# Patient Record
Sex: FEMALE | Race: WHITE
[De-identification: names, ages, dates, MRNs, and addresses within clinical notes are randomized per-mention and may not be internally consistent; named-entity substitution may affect disease eponyms.]

---

## 2017-02-23 ENCOUNTER — HOSPITAL ENCOUNTER (OUTPATIENT)
Dept: HOSPITAL 84 - D.RAD | Age: 72
Setting detail: OBSERVATION
LOS: 3 days | Discharge: NURSING, REHAB OR LONG-TERM CARE | End: 2017-02-26
Attending: FAMILY MEDICINE | Admitting: FAMILY MEDICINE
Payer: MEDICARE

## 2017-02-23 VITALS — SYSTOLIC BLOOD PRESSURE: 182 MMHG | DIASTOLIC BLOOD PRESSURE: 78 MMHG

## 2017-02-23 VITALS — BODY MASS INDEX: 33.8 KG/M2 | HEIGHT: 64 IN | BODY MASS INDEX: 33.8 KG/M2 | WEIGHT: 198 LBS

## 2017-02-23 DIAGNOSIS — E78.5: ICD-10-CM

## 2017-02-23 DIAGNOSIS — Z79.4: ICD-10-CM

## 2017-02-23 DIAGNOSIS — L89.153: ICD-10-CM

## 2017-02-23 DIAGNOSIS — E11.65: ICD-10-CM

## 2017-02-23 DIAGNOSIS — D50.9: ICD-10-CM

## 2017-02-23 DIAGNOSIS — R53.2: ICD-10-CM

## 2017-02-23 DIAGNOSIS — I69.391: Primary | ICD-10-CM

## 2017-02-23 DIAGNOSIS — R13.10: ICD-10-CM

## 2017-02-23 DIAGNOSIS — I69.351: ICD-10-CM

## 2017-02-23 DIAGNOSIS — I10: ICD-10-CM

## 2017-02-23 NOTE — NUR
PATIENT RECEIVED TO ROOM 2205 VIA  DIRECT ADMIT TO DR. RICHARDSON. A/O X3. FAMILY
AT BEDSIDE. STAGE 3 TO COCCYX AREA WHICH APPEARS TO BE HEALING AT THIS TIME.
POSITIONED ON LEFT SIDE FOR COMFORT. DENIES NEEDS.

## 2017-02-23 NOTE — NUR
PATIENT SLEEPING ON LEFT SIDE. HOB 30 DEGREES. RR EVEN AND UNLABORED. 0 S/S OF
DISTRESS. IV TO RIGHT WRIST PATENT WITH NO REDNESS OR SWELLING. CHRONIC KELLEY
DRAINING TO GRAVITY. COLOSTOMY TO LLQ WITH SMALL AMOUNT OF FORMED STOOL.
MEPILEX TO COCCYX CDI AND LIDOCAINE PATCH TO LOWER BACK. DAUGHTER AT BEDSIDE.
SRX2. BED LOW. CALL LIGHT WITHIN REACH.

## 2017-02-24 VITALS — DIASTOLIC BLOOD PRESSURE: 56 MMHG | SYSTOLIC BLOOD PRESSURE: 128 MMHG

## 2017-02-24 VITALS — SYSTOLIC BLOOD PRESSURE: 178 MMHG | DIASTOLIC BLOOD PRESSURE: 94 MMHG

## 2017-02-24 VITALS — DIASTOLIC BLOOD PRESSURE: 93 MMHG | SYSTOLIC BLOOD PRESSURE: 198 MMHG

## 2017-02-24 VITALS — DIASTOLIC BLOOD PRESSURE: 83 MMHG | SYSTOLIC BLOOD PRESSURE: 156 MMHG

## 2017-02-24 VITALS — DIASTOLIC BLOOD PRESSURE: 77 MMHG | SYSTOLIC BLOOD PRESSURE: 150 MMHG

## 2017-02-24 VITALS — SYSTOLIC BLOOD PRESSURE: 193 MMHG | DIASTOLIC BLOOD PRESSURE: 78 MMHG

## 2017-02-24 VITALS — DIASTOLIC BLOOD PRESSURE: 75 MMHG | SYSTOLIC BLOOD PRESSURE: 183 MMHG

## 2017-02-24 VITALS — SYSTOLIC BLOOD PRESSURE: 198 MMHG | DIASTOLIC BLOOD PRESSURE: 78 MMHG

## 2017-02-24 VITALS — SYSTOLIC BLOOD PRESSURE: 157 MMHG | DIASTOLIC BLOOD PRESSURE: 84 MMHG

## 2017-02-24 VITALS — SYSTOLIC BLOOD PRESSURE: 219 MMHG | DIASTOLIC BLOOD PRESSURE: 100 MMHG

## 2017-02-24 VITALS — SYSTOLIC BLOOD PRESSURE: 139 MMHG | DIASTOLIC BLOOD PRESSURE: 68 MMHG

## 2017-02-24 VITALS — DIASTOLIC BLOOD PRESSURE: 87 MMHG | SYSTOLIC BLOOD PRESSURE: 177 MMHG

## 2017-02-24 LAB
ALBUMIN SERPL-MCNC: 3 G/DL (ref 3.4–5)
ALP SERPL-CCNC: 78 U/L (ref 46–116)
ALT SERPL-CCNC: 20 U/L (ref 10–68)
ANION GAP SERPL CALC-SCNC: 11.8 MMOL/L (ref 8–16)
BASOPHILS NFR BLD AUTO: 0.9 % (ref 0–2)
BILIRUB SERPL-MCNC: 0.31 MG/DL (ref 0.2–1.3)
BUN SERPL-MCNC: 22 MG/DL (ref 7–18)
CALCIUM SERPL-MCNC: 9.6 MG/DL (ref 8.5–10.1)
CHLORIDE SERPL-SCNC: 105 MMOL/L (ref 98–107)
CO2 SERPL-SCNC: 28.8 MMOL/L (ref 21–32)
CREAT SERPL-MCNC: 1.6 MG/DL (ref 0.6–1.3)
EOSINOPHIL NFR BLD: 2.4 % (ref 0–7)
ERYTHROCYTE [DISTWIDTH] IN BLOOD BY AUTOMATED COUNT: 12.7 % (ref 11.5–14.5)
GLOBULIN SER-MCNC: 5.6 G/L
GLUCOSE SERPL-MCNC: 197 MG/DL (ref 74–106)
HCT VFR BLD CALC: 38.5 % (ref 36–48)
HGB BLD-MCNC: 12.4 G/DL (ref 12–16)
IMM GRANULOCYTES NFR BLD: 0.2 % (ref 0–5)
LYMPHOCYTES NFR BLD AUTO: 37.6 % (ref 15–50)
MCH RBC QN AUTO: 28.7 PG (ref 26–34)
MCHC RBC AUTO-ENTMCNC: 32.2 G/DL (ref 31–37)
MCV RBC: 89.1 FL (ref 80–100)
MONOCYTES NFR BLD: 8.6 % (ref 2–11)
NEUTROPHILS NFR BLD AUTO: 50.3 % (ref 40–80)
OSMOLALITY SERPL CALC.SUM OF ELEC: 290 MOSM/KG (ref 275–300)
PLATELET # BLD: 334 10X3/UL (ref 130–400)
PMV BLD AUTO: 10.6 FL (ref 7.4–10.4)
POTASSIUM SERPL-SCNC: 3.6 MMOL/L (ref 3.5–5.1)
PROT SERPL-MCNC: 8.6 G/DL (ref 6.4–8.2)
RBC # BLD AUTO: 4.32 10X6/UL (ref 4–5.4)
SODIUM SERPL-SCNC: 142 MMOL/L (ref 136–145)
WBC # BLD AUTO: 9.1 10X3/UL (ref 4.8–10.8)

## 2017-02-24 NOTE — NUR
PATIENT ON LEFT SIDE. HOB 30 DEGREES. AAOX4. RR EVEN AND UNLABORED. PAIN A
7/10 ACCORDING TO THE FLACC SCALE. DEMEROL GIVEN PER ORDER. WILL REASSESS. IV
TO RIGHT WRIST PATENT WITH NO REDNESS OR SWELLING. DRESSING AROUND PEG TUBE
SATURATED. ACCORDING TO DIANA SALGADO RN, DR. PENG IS AWARE AND INSTRUCTED
TO "APPLY PRESSURE AND IT WILL CLOT OFF." DRESSING CHANGED. COLOSTOMY WITH
FORMED STOOL. CHRONIC CATH DRAINING TO GRAVITY. DRESSING TO COCCYX CDI. SON AT
BEDSIDE. CALL LIGHT WITHIN REACH.

## 2017-02-24 NOTE — NUR
Nutrition Consult:
Orders in to start TF of Glucerna 1.0 on 2/25/17. Start TF of Glucerna 1.0 @
20 ml/hr. Advance 10 ml every 6-8 hours as tolerated to goal rate of 75 ml/hr.
Water flushes 25 ml/hr.
RD will continue to monitor pt progress.

## 2017-02-24 NOTE — NUR
WOUND CARE CONSULT:
NOTED PT TO HAVE A CHRONIC PRESSURE INJURY THAT APPEARS AS A HEALING STAGE 4.
IT MEASURES 5CM X 7CM X 1CM.  THE WOUND BED IS PINK WITH YELLOW.  THERE IS NO
ODOR.  PT IS A RESIDENT AT [facility] AND THE CURRENT TX IS SANTYL.  RECOMMEND
CONTINUING TO USE THIS.
WOUND CARE WILL MONITOR.

## 2017-02-24 NOTE — NUR
PEG SITE CONTINUES TO OOZE. SPOKE WITH JASON AT DR. WAGGONER OFFICE. HE WAS
NOTIFIED PER JASON. TOLD TO HOLD PRESSURE TO AREA. OOZING HAS SLOWED DOWN BUT
STILL OCCURRING. WILL CONTINUE TO MONITOR.

## 2017-02-24 NOTE — NUR
NEW ORDERS RECEIVED FOR PAIN MANAGEMENT. GIVEN 1MG MORPHINE SLOW IVP FOR PAIN
LEVEL 7. WILL MONITOR.

## 2017-02-24 NOTE — NUR
PT. AAO X4. PT. NONVERBAL. PT. SITTING UP IN BED WITH DAUGHTER PRESENT. PT.
DENIES PAIN AT THIS TIME. RESP. EVEN AND NONLABORED LUNG SOUNDS CLEAR.
CONTRACTURES OF BILATERAL FEET. WOUND ON COCCYX STAGE III PRESSURE ULCER
COVERED IN A DRESSING. SKIN PINK WARM AND DRY WITH GOOD TURGOR AND NO EDEMA
HANDS AND TOES COOL TO TOUCH. VISUAL NOD AFFIRMING
FEELING  ME TOUCHING HANDS AND FEET. ABDOMEN SOFT AND NONTENDER. [id number]
BED AT LOWEST SETTING. SRX2 CALL LIGHT WITHIN REACH. DAUGHTER IN ROOM

## 2017-02-24 NOTE — CN
PATIENT NAME:JEISON VALENZUELA                                MEDICAL RECORD: [id number]
: 43                                              LOCATION:D.[facility]
ADMIT DATE: 17                                       ACCOUNT: [id number]
CONSULTING PHYSICIAN:    TACHO PENG MD              
                                               
REFERRING PHYSICIAN:     HANNA RICHARDSON M.D.              
 
 
DATE OF CONSULTATION:  2017
 
Gastrointestinal Consultation
 
REFERRING PHYSICIAN:  Hanna Richardson MD.
 
HISTORY OF PRESENT ILLNESS:  The patient is a 71-year-old white female who
resides in a local nursing home and who has a history of cerebrovascular
disease, stemming from right carotid endarterectomy back in  with subsequent
right hemiplegia, aphasia, and temporary dysphagia, is now admitted with
recurrent problems, dysphagia, now documented aspiration on bedside swallow
eval.  I was asked to see the patient for possible PEG tube placement.
 
She apparently had a PEG tube placed for a few months after initial stroke in
.  She responded to speech therapy exercises and was able to tolerate a
normal diet until just a few days ago.
 
PAST MEDICAL HISTORY:  As above.  She also has diabetes, chronic Trejo,
hypertension, hyperlipidemia.
 
SOCIAL HISTORY:  The patient is a nonsmoker, nondrinker.
 
FAMILY HISTORY:  Negative for GI disease.
 
REVIEW OF SYSTEMS:  Noncontributory other than in the HPI..
 
ALLERGIES:  ROCEPHIN AND LEVAQUIN.
 
HOME MEDICATIONS:  Include aspirin, Lidocaine patch, buspirone, lisinopril,
Plavix, iron sulfate, Lopressor, Xalatan eye drops, Pravachol, DuoNeb, Norco,
clonidine p.r.n., tramadol and insulin.
 
PHYSICAL EXAMINATION:
GENERAL:  Reveals an elderly white female with aphasia, but in no acute
distress.
VITAL SIGNS:  Stable.  She is afebrile.
CHEST:  Clear.
HEART:  Regular rate and rhythm.
ABDOMEN:  Soft, nontender.
EXTREMITIES:  No edema.  I should note that she has a colostomy in place and she
has normal formed brown stool in it.
 
LABORATORY DATA:  Pending at time of this dictation.
 
IMPRESSION:  Recurrent oropharyngeal dysphagia, most likely due to recurrent
cerebrovascular accident, now with documented aspiration on bedside swallow
eval.
 
RECOMMENDATION:  PEG tube placement in a.m.
 
 
 
 
CONSULT REPORT                                 [id number]    JEISON VALENZUELA              
 
 
TRANSINT:[id number] Voice Confirmation ID: [id number] DOCUMENT ID: [id number]
                                           
                                           TACHO PENG MD              
 
 
 
Electronically Signed by TACHO PENG on 17 at 1215
 
 
 
 
 
 
 
 
 
 
 
 
 
 
 
 
 
 
 
 
 
 
 
 
 
 
 
 
 
 
 
 
 
 
 
 
 
 
 
 
CC: HANNA RICHARDSON M.D.                                            [id number]
DICTATION DATE: 17     :     17 0029      ADM IN  
                                                                              
[place] [address] [place] [id number]

## 2017-02-25 VITALS — DIASTOLIC BLOOD PRESSURE: 68 MMHG | SYSTOLIC BLOOD PRESSURE: 156 MMHG

## 2017-02-25 VITALS — DIASTOLIC BLOOD PRESSURE: 82 MMHG | SYSTOLIC BLOOD PRESSURE: 193 MMHG

## 2017-02-25 VITALS — DIASTOLIC BLOOD PRESSURE: 77 MMHG | SYSTOLIC BLOOD PRESSURE: 176 MMHG

## 2017-02-25 VITALS — SYSTOLIC BLOOD PRESSURE: 117 MMHG | DIASTOLIC BLOOD PRESSURE: 63 MMHG

## 2017-02-25 VITALS — SYSTOLIC BLOOD PRESSURE: 182 MMHG | DIASTOLIC BLOOD PRESSURE: 85 MMHG

## 2017-02-25 LAB
ALBUMIN SERPL-MCNC: 2.6 G/DL (ref 3.4–5)
ALP SERPL-CCNC: 72 U/L (ref 46–116)
ALT SERPL-CCNC: 21 U/L (ref 10–68)
ANION GAP SERPL CALC-SCNC: 4.8 MMOL/L (ref 8–16)
BASOPHILS NFR BLD AUTO: 0.4 % (ref 0–2)
BILIRUB SERPL-MCNC: 0.3 MG/DL (ref 0.2–1.3)
BUN SERPL-MCNC: 20 MG/DL (ref 7–18)
CALCIUM SERPL-MCNC: 8.9 MG/DL (ref 8.5–10.1)
CHLORIDE SERPL-SCNC: 100 MMOL/L (ref 98–107)
CO2 SERPL-SCNC: 29.5 MMOL/L (ref 21–32)
CREAT SERPL-MCNC: 1.8 MG/DL (ref 0.6–1.3)
EOSINOPHIL NFR BLD: 0.8 % (ref 0–7)
ERYTHROCYTE [DISTWIDTH] IN BLOOD BY AUTOMATED COUNT: 13.2 % (ref 11.5–14.5)
GLOBULIN SER-MCNC: 4.8 G/L
GLUCOSE SERPL-MCNC: 216 MG/DL (ref 74–106)
HCT VFR BLD CALC: 33 % (ref 36–48)
HGB BLD-MCNC: 10.3 G/DL (ref 12–16)
IMM GRANULOCYTES NFR BLD: 0.2 % (ref 0–5)
LYMPHOCYTES NFR BLD AUTO: 21.2 % (ref 15–50)
MCH RBC QN AUTO: 28.1 PG (ref 26–34)
MCHC RBC AUTO-ENTMCNC: 31.2 G/DL (ref 31–37)
MCV RBC: 90.2 FL (ref 80–100)
MONOCYTES NFR BLD: 6.9 % (ref 2–11)
NEUTROPHILS NFR BLD AUTO: 70.5 % (ref 40–80)
OSMOLALITY SERPL CALC.SUM OF ELEC: 272 MOSM/KG (ref 275–300)
PLATELET # BLD: 347 10X3/UL (ref 130–400)
PMV BLD AUTO: 11 FL (ref 7.4–10.4)
POTASSIUM SERPL-SCNC: 3.3 MMOL/L (ref 3.5–5.1)
PROT SERPL-MCNC: 7.4 G/DL (ref 6.4–8.2)
RBC # BLD AUTO: 3.66 10X6/UL (ref 4–5.4)
SODIUM SERPL-SCNC: 131 MMOL/L (ref 136–145)
WBC # BLD AUTO: 10.2 10X3/UL (ref 4.8–10.8)

## 2017-02-25 NOTE — NUR
PT REC'D FROM EZEKIEL WALLER. RESTING IN BED WITH EYES CLOSED. EASILY AROUSED. PT
UNABLE TO ANSWER QUESTIONS. CAN ONLY SAY YES AND NO, BUT IS ABLE TO SHAKE HEAD
TO ANSWER QUESTIONS. REGULAR HEART RATE AND RHYTHM. LUNG SOUNDS CLEAR AND
EQUAL BILAT. PT HAS OCCASSIONAL PRODUCTIVE COUGH WITH THICK WHITE SPUTUM.
BOWEL SOUNDS ACTIVE AT RLQ, HYPOACTIVE AT RUQ AND LUQ, ACTIVE AT LLQ. PEG TUBE
TO LLQ WITH PRESSURE DRESSING ON. DRESSING CLEAN, DRY, AND INTACT. TURNED TO L
SIDE. HAND  UNEQUAL WITH WEAKNESS TO R SIDE. BILAT FOOT DROP. BED LOW,
CALL LIGHT IN REACH, DENIES NEEDS.

## 2017-02-25 NOTE — NUR
MORNING MEDS PASSED THROUGH PEG TUBE AT THIS TIME. CRUSHED AND DISOLVED IN
WARM WATER. MARIFER BACK 5ML OF RESIDUAL FROM PEG TUBE. FLUSHED WITH 30CC'S OF
H2O. MEDS FLUSHED THROUGH W/O ISSUE. FLUSED WITH ANOTHER 30CC'S OF WARM WATER.
TUBE FEEDING STARTED AT 20CC'S PER HOUR FLUSHING WITH 100CC'S OF H20 Q6 HOURS.
BED LOW, CALL LIGHT IN REACH, DENIES NEEDS.

## 2017-02-25 NOTE — NUR
CHECKED RESIDUAL AND GOT 5ML. INCREASED TUBE FEEDINGS BY 10ML/HR PER ORDER.
ADMINISTERED NIGHTTIME MEDS VIA PEG TUBE. DEMEROL GIVEN FOR PAIN OF AN 8/10
ACCORDING TO FLACC SCALE. WILL REASSESS.

## 2017-02-25 NOTE — NUR
DRESSING TO COCCYX CHANGED DUE TO PURULENT DRAINAGE. USED WOUND CLEANSER AND
4X4'S TO CLEAN WOUND, AND SANTYL OINTMENT APPLIED. 4X4'S AND MEPILEX APPLIED.
PRN MORPHINE ADMINSTERED DUE TO PT GRIMACING WHEN TURNING. BED LOW, CALL LIGHT
IN REACH, DENIES NEEDS.

## 2017-02-26 VITALS — SYSTOLIC BLOOD PRESSURE: 109 MMHG | DIASTOLIC BLOOD PRESSURE: 58 MMHG

## 2017-02-26 VITALS — DIASTOLIC BLOOD PRESSURE: 92 MMHG | SYSTOLIC BLOOD PRESSURE: 207 MMHG

## 2017-02-26 VITALS — SYSTOLIC BLOOD PRESSURE: 142 MMHG | DIASTOLIC BLOOD PRESSURE: 57 MMHG

## 2017-02-26 VITALS — SYSTOLIC BLOOD PRESSURE: 178 MMHG | DIASTOLIC BLOOD PRESSURE: 84 MMHG

## 2017-02-26 LAB
ALBUMIN SERPL-MCNC: 2.4 G/DL (ref 3.4–5)
ALP SERPL-CCNC: 64 U/L (ref 46–116)
ALT SERPL-CCNC: 20 U/L (ref 10–68)
ANION GAP SERPL CALC-SCNC: 8.6 MMOL/L (ref 8–16)
BASOPHILS NFR BLD AUTO: 0.5 % (ref 0–2)
BILIRUB SERPL-MCNC: 0.22 MG/DL (ref 0.2–1.3)
BUN SERPL-MCNC: 17 MG/DL (ref 7–18)
CALCIUM SERPL-MCNC: 8.8 MG/DL (ref 8.5–10.1)
CHLORIDE SERPL-SCNC: 104 MMOL/L (ref 98–107)
CO2 SERPL-SCNC: 31 MMOL/L (ref 21–32)
CREAT SERPL-MCNC: 1.6 MG/DL (ref 0.6–1.3)
EOSINOPHIL NFR BLD: 2.9 % (ref 0–7)
ERYTHROCYTE [DISTWIDTH] IN BLOOD BY AUTOMATED COUNT: 13.1 % (ref 11.5–14.5)
GLOBULIN SER-MCNC: 4.6 G/L
GLUCOSE SERPL-MCNC: 106 MG/DL (ref 74–106)
HCT VFR BLD CALC: 31 % (ref 36–48)
HGB BLD-MCNC: 9.5 G/DL (ref 12–16)
IMM GRANULOCYTES NFR BLD: 0.1 % (ref 0–5)
LYMPHOCYTES NFR BLD AUTO: 29.4 % (ref 15–50)
MCH RBC QN AUTO: 27.9 PG (ref 26–34)
MCHC RBC AUTO-ENTMCNC: 30.6 G/DL (ref 31–37)
MCV RBC: 90.9 FL (ref 80–100)
MONOCYTES NFR BLD: 7 % (ref 2–11)
NEUTROPHILS NFR BLD AUTO: 60.1 % (ref 40–80)
OSMOLALITY SERPL CALC.SUM OF ELEC: 280 MOSM/KG (ref 275–300)
PLATELET # BLD: 321 10X3/UL (ref 130–400)
PMV BLD AUTO: 10.9 FL (ref 7.4–10.4)
POTASSIUM SERPL-SCNC: 3.6 MMOL/L (ref 3.5–5.1)
PROT SERPL-MCNC: 7 G/DL (ref 6.4–8.2)
RBC # BLD AUTO: 3.41 10X6/UL (ref 4–5.4)
SODIUM SERPL-SCNC: 140 MMOL/L (ref 136–145)
WBC # BLD AUTO: 10.1 10X3/UL (ref 4.8–10.8)

## 2017-02-26 NOTE — NUR
IV TO R WRIST DC'D WITH CATHETER INTACT. PRN NORCO ADMINISTERED PER PT
COMPLAINTS OF SHOULDER PAIN. DC INSTRUCTIONS DISCUSSED WITH PT AND DAUGHTER.
DC PAPERS SIGNED WITH NO QUESTIONS OR CONCERNS VOICED AT THIS TIME. AMBULANCE
CALLED FOR . SPOKE WITH EMIR, STATED IT WOULD BE 45 MINUTES TO AN
HOUR. PT DRESSED AND READY TO GO. BED LOW, CALL LIGHT IN REACH, DENIES NEEDS.

## 2017-02-26 NOTE — NUR
PT REC'D FROM EZEKIEL WALLER. RESTING IN BED WITH EYES CLOSED AND DAUGHTER IN
ROOM. NO SIGNS OF DISTRESS. RESP EVEN AND UNLABORED. TUBE FEEDING GOING AT
30CC'S TO LUQ PEG TUBE. DRESSING AROUND PEG CDI. SOFT DARK BROWN STOOL TO
COLOSTOMY BAG AT LLQ. SCD'S ON. BED LOW, CALL LIGHT IN REACH, DENIES NEEDS.
CPOC.

## 2017-02-26 NOTE — NUR
REPORT CALLED TO EZEKIEL TEJADA, AT [facility]. DRESSING CHANGE TO COCCYX
PERFORMED, COLOSTOMY BAG CHANGED. WILL CALL AMBULANCE SERVICE FOR .

## 2017-02-26 NOTE — NUR
MORNING MEDS PASSED AT THIS TIME. 37 UNITS OF INSULIN ADMINISTERED PER ORDERS
TO RUQ. MEDS CRUSHED AND MIXED IN WARM WATER AND LET DISSOLVE FOR 5 MINUTES.
ABLE TO DRAW 5CC'S OF RESIDULE FROM G TUBE. FLUSHED WITH 30CC'S OF WARM WATER.
MEDS ADMINISTERED. FLUSHED AGAIN AND DISCONNECTED FROM FEEDING FOR 30 MINUTES.
SOFT DARK STOOL TO COLOSTOMY BAG. LIDOCAINE PATCH APPLIED TO LOWER BACK ABOVE
COCCYX. PT REPOSITIONED TO L SIDE. BED LOW, CALL LIGHT IN REACH, DENIES NEEDS.

## 2017-02-26 NOTE — NUR
FAMILY AT BEDSIDE. PT RESTING IN BED WITH NO COMPLAINTS OR CONCERNS VOICED AT
THIS TIME. BED LOW, CALL LIGHT IN REACH, DENIES NEEDS.

## 2017-02-26 NOTE — NUR
TUBE FEEDING BAG CHANGED. ABLE TO DRAW BACK 5CC'S RESIDUAL. BAGS SIGNED AND
DATED. BED LOW, CALL LIGHT IN REACH, DENIES NEEDS AND PAIN. CPOC.

## 2017-02-26 NOTE — NUR
CURRENT FSBS 138. NO INSULIN GIVEN PER SS. ELIESER IN ROOM CHECKING VS. VSS. BED
LOW, CALL LIGHT IN REACH, DAUGHTER AT BEDSIDE. CPOC.

## 2017-02-26 NOTE — NUR
Late Entry
Awaited primary plan 2/25/17. Did not get to review. MD [id number] note stated plan
to admit
however no admit order. Spoke with Ashley CABRERA, today. Plan to return
patient to skilled facility [facility] today.
TC to [facility] . Spoke w/ Josselyn. Discussed return today. She advised Monday
was better for them because they could not provide transportation. Reviewed
criteria on PCS. Patient could return by ambulance today.
TC to Ashley. She entered discharge orders for today. CM spoke w/ primary
nurse. Advised that patient had possible speech eval pending. Gave contact
information for report to receiving nurse. Advised primary to notify the
family of discharge.
CM faxed clinical for review to Josselyn at [facility]. RAMOS spoke with flow
coordinator, Nicolle.
RAMOS spoke riley/ Jerome from speech who was on board and was planning to see the
patient today.
TC to [facility]. spoke with Geoffrey to advise of transfer of patient out of town
to [facility] in [place]. Information given. PCS form completed by flow
coordinator.
Patient has been cleared for discharge by GI.
RAMOS spoke w/ Josselyn. She received all faxed clinical. Patient will be going
into a Medicare bed.

## 2017-02-28 NOTE — PRO
PATIENT:JEISON VALENZUELA                                   MEDICAL RECORD: [id number]
: 43                                            LOCATION:D.MS [facility]
                                                         ADMISSION DATE: 17
 
PROCEDURE PERFORMED BY: TACHO LONGO MD              
 
 
DATE OF PROCEDURE:  2017
 
:  Tacho Longo MD
 
PROCEDURE:  EGD with PEG tube placement.
 
INDICATION:  The patient is a 72-year-old white female with history of
cerebrovascular disease, status post PEG tube placement for dysphagia problems
about 4 years ago.  However, she improved and was able to tolerate oral feedings
for the past 4 years and told just this past several days, what looks like she
possibly had recurrent CVA.  She now is having marked problem with signs of
aspiration and dysphagia.  A bedside swallow eval revealed obvious aspiration. 
She is now for repeat PEG placement.
 
PREMEDICATION:  TIVA for anesthesia.
 
INSTRUMENT:  Olympus video gastroscope.
 
FINDINGS:  The endoscope was passed through the oropharynx to the second portion
of the duodenum without difficulty.  The esophagus, stomach and duodenum were
normal.  The stomach was fully insufflated with air and appropriate site for PEG
tube placement visualized by means of transillumination and palpation.  The skin
was prepped and draped in sterile fashion.  Local anesthesia 1% lidocaine was
used.  An introducer catheter was then passed through the skin into the gastric
lumen as noted with the scope.  A guidewire was then passed through the catheter
and grabbed with a snare that had been passed through the scope.  The scope was
then withdrawn with a guidewire attached.  The PEG apparatus was then attached
to the guidewire, pulled through the oropharynx, through the esophagus, and
secured to the gastric lumen as noted by repeat endoscopy.  The patient
tolerated the procedure well without any immediate complication.
 
IMPRESSION:
1.  Status post successful PEG tube placement.
2.  Otherwise, normal esophagogastroduodenoscopy.
 
RECOMMENDATIONS:
1.  Okay to use PEG for meds now.
2.  Okay to start tube feeding in a.m.
3.  Abdominal binder over PEG tube site at all times.
4.  Flush PEG with 100 mL of water every 6 hours and with meds.
5.  Check PEG residuals every 12 hours and call if greater than 100 cc.
 
TRANSINT:[id number] Voice Confirmation ID: [id number] DOCUMENT ID: [id number]
 
 
 
PROCEDURE NOTE                                 [id number]    NICOLASTACHO CÁRDENAS MD              
 
 
 
Electronically Signed by TACHO LONGO on 17 at 1841
 
 
 
 
 
 
 
 
 
 
 
 
 
 
 
 
 
 
 
 
 
 
 
 
 
 
 
 
 
 
 
 
 
 
 
 
 
 
 
 
CC: HANNA RICHARDSON M.D. and PARESH COMBS MD                       [id number]
DICTATION DATE: 17 1229     :     17 0227      DIS IN  
                                                                      17
[facility]                                          
[address] [phone]

## 2017-03-21 ENCOUNTER — HOSPITAL ENCOUNTER (INPATIENT)
Dept: HOSPITAL 84 - D.ER | Age: 72
LOS: 7 days | Discharge: NURSING, REHAB OR LONG-TERM CARE | DRG: 682 | End: 2017-03-28
Attending: FAMILY MEDICINE | Admitting: FAMILY MEDICINE
Payer: MEDICARE

## 2017-03-21 VITALS
HEIGHT: 63 IN | WEIGHT: 187.52 LBS | HEIGHT: 63 IN | WEIGHT: 187.52 LBS | BODY MASS INDEX: 33.23 KG/M2 | BODY MASS INDEX: 33.23 KG/M2 | BODY MASS INDEX: 33.23 KG/M2

## 2017-03-21 DIAGNOSIS — E87.5: ICD-10-CM

## 2017-03-21 DIAGNOSIS — Z86.73: ICD-10-CM

## 2017-03-21 DIAGNOSIS — N39.0: ICD-10-CM

## 2017-03-21 DIAGNOSIS — N17.9: Primary | ICD-10-CM

## 2017-03-21 DIAGNOSIS — R53.2: ICD-10-CM

## 2017-03-21 DIAGNOSIS — Z79.4: ICD-10-CM

## 2017-03-21 DIAGNOSIS — I10: ICD-10-CM

## 2017-03-21 DIAGNOSIS — E11.65: ICD-10-CM

## 2017-03-21 DIAGNOSIS — M46.28: ICD-10-CM

## 2017-03-21 DIAGNOSIS — L89.154: ICD-10-CM

## 2017-03-21 LAB
ALBUMIN SERPL-MCNC: 3.2 G/DL (ref 3.4–5)
ALP SERPL-CCNC: 89 U/L (ref 46–116)
ALT SERPL-CCNC: 27 U/L (ref 10–68)
ANION GAP SERPL CALC-SCNC: 16.1 MMOL/L (ref 8–16)
APPEARANCE UR: (no result)
BACTERIA #/AREA URNS HPF: (no result) /HPF
BASOPHILS NFR BLD AUTO: 0.4 % (ref 0–2)
BILIRUB SERPL-MCNC: 0.18 MG/DL (ref 0.2–1.3)
BILIRUB SERPL-MCNC: NEGATIVE MG/DL
BUN SERPL-MCNC: 78 MG/DL (ref 7–18)
CALCIUM SERPL-MCNC: 9.6 MG/DL (ref 8.5–10.1)
CHLORIDE SERPL-SCNC: 94 MMOL/L (ref 98–107)
CO2 SERPL-SCNC: 29.1 MMOL/L (ref 21–32)
COLOR UR: YELLOW
CREAT SERPL-MCNC: 1.5 MG/DL (ref 0.6–1.3)
EOSINOPHIL NFR BLD: 1.5 % (ref 0–7)
ERYTHROCYTE [DISTWIDTH] IN BLOOD BY AUTOMATED COUNT: 13.3 % (ref 11.5–14.5)
GLOBULIN SER-MCNC: 5.1 G/L
GLUCOSE SERPL-MCNC: 252 MG/DL (ref 74–106)
GLUCOSE SERPL-MCNC: NEGATIVE MG/DL
HCT VFR BLD CALC: 33.5 % (ref 36–48)
HGB BLD-MCNC: 11 G/DL (ref 12–16)
IMM GRANULOCYTES NFR BLD: 0.1 % (ref 0–5)
KETONES UR STRIP-MCNC: NEGATIVE MG/DL
LEUKOCYTE ESTERASE: (no result)
LYMPHOCYTES NFR BLD AUTO: 25.3 % (ref 15–50)
MCH RBC QN AUTO: 28.6 PG (ref 26–34)
MCHC RBC AUTO-ENTMCNC: 32.8 G/DL (ref 31–37)
MCV RBC: 87 FL (ref 80–100)
MONOCYTES NFR BLD: 6.3 % (ref 2–11)
MUCOUS THREADS #/AREA URNS LPF: (no result) /LPF
NEUTROPHILS NFR BLD AUTO: 66.4 % (ref 40–80)
NITRITE UR-MCNC: POSITIVE MG/ML
OSMOLALITY SERPL CALC.SUM OF ELEC: 299 MOSM/KG (ref 275–300)
PH UR STRIP: 8 [PH] (ref 5–6)
PLATELET # BLD: 341 10X3/UL (ref 130–400)
PMV BLD AUTO: 11.3 FL (ref 7.4–10.4)
POTASSIUM SERPL-SCNC: 5.2 MMOL/L (ref 3.5–5.1)
PROT SERPL-MCNC: 8.3 G/DL (ref 6.4–8.2)
PROT UR-MCNC: (no result) MG/DL
RBC # BLD AUTO: 3.85 10X6/UL (ref 4–5.4)
RBC #/AREA URNS HPF: (no result) /HPF (ref 0–5)
SODIUM SERPL-SCNC: 134 MMOL/L (ref 136–145)
SP GR UR STRIP: 1 (ref 1–1.02)
SQUAMOUS #/AREA URNS HPF: (no result) /HPF (ref 0–5)
UROBILINOGEN UR-MCNC: NORMAL MG/DL
WBC # BLD AUTO: 9.9 10X3/UL (ref 4.8–10.8)
WBC #/AREA URNS HPF: (no result) /HPF (ref 0–5)

## 2017-03-22 VITALS — SYSTOLIC BLOOD PRESSURE: 157 MMHG | DIASTOLIC BLOOD PRESSURE: 70 MMHG

## 2017-03-22 VITALS — SYSTOLIC BLOOD PRESSURE: 178 MMHG | DIASTOLIC BLOOD PRESSURE: 70 MMHG

## 2017-03-22 VITALS — SYSTOLIC BLOOD PRESSURE: 168 MMHG | DIASTOLIC BLOOD PRESSURE: 72 MMHG

## 2017-03-22 VITALS — DIASTOLIC BLOOD PRESSURE: 76 MMHG | SYSTOLIC BLOOD PRESSURE: 155 MMHG

## 2017-03-22 VITALS — DIASTOLIC BLOOD PRESSURE: 59 MMHG | SYSTOLIC BLOOD PRESSURE: 152 MMHG

## 2017-03-22 VITALS — DIASTOLIC BLOOD PRESSURE: 98 MMHG | SYSTOLIC BLOOD PRESSURE: 160 MMHG

## 2017-03-22 LAB
ANION GAP SERPL CALC-SCNC: 13.1 MMOL/L (ref 8–16)
BASOPHILS NFR BLD AUTO: 0.3 % (ref 0–2)
BUN SERPL-MCNC: 63 MG/DL (ref 7–18)
CALCIUM SERPL-MCNC: 9.2 MG/DL (ref 8.5–10.1)
CHLORIDE SERPL-SCNC: 100 MMOL/L (ref 98–107)
CO2 SERPL-SCNC: 28 MMOL/L (ref 21–32)
CREAT SERPL-MCNC: 1.7 MG/DL (ref 0.6–1.3)
EOSINOPHIL NFR BLD: 0.1 % (ref 0–7)
ERYTHROCYTE [DISTWIDTH] IN BLOOD BY AUTOMATED COUNT: 13.3 % (ref 11.5–14.5)
ERYTHROCYTE [SEDIMENTATION RATE] IN BLOOD: 110 MM/HR (ref 0–30)
GLUCOSE SERPL-MCNC: 317 MG/DL (ref 74–106)
HCT VFR BLD CALC: 30.6 % (ref 36–48)
HGB BLD-MCNC: 9.9 G/DL (ref 12–16)
IMM GRANULOCYTES NFR BLD: 0.3 % (ref 0–5)
LYMPHOCYTES NFR BLD AUTO: 18.6 % (ref 15–50)
MCH RBC QN AUTO: 28 PG (ref 26–34)
MCHC RBC AUTO-ENTMCNC: 32.4 G/DL (ref 31–37)
MCV RBC: 86.4 FL (ref 80–100)
MONOCYTES NFR BLD: 4.4 % (ref 2–11)
NEUTROPHILS NFR BLD AUTO: 76.3 % (ref 40–80)
OSMOLALITY SERPL CALC.SUM OF ELEC: 301 MOSM/KG (ref 275–300)
PLATELET # BLD: 340 10X3/UL (ref 130–400)
PMV BLD AUTO: 11 FL (ref 7.4–10.4)
POTASSIUM SERPL-SCNC: 5.1 MMOL/L (ref 3.5–5.1)
RBC # BLD AUTO: 3.54 10X6/UL (ref 4–5.4)
SODIUM SERPL-SCNC: 136 MMOL/L (ref 136–145)
WBC # BLD AUTO: 10.5 10X3/UL (ref 4.8–10.8)

## 2017-03-22 NOTE — NUR
03/21/17 @ 23:45, PT RECEIVED VIA STRETCH FROM ER. PT AWAKE, AND ALERT,
DAUGHTER AT BEDSIDE. PT IS A RESIDENT OF [facility] LIVING IN
[place]. MICHI SANTOS, ON CALL FOR [employer], PAGED @ 00:30 AND AUTHORIZED
IMMEDIATE ORDERS FOR PTS PRN NORCO, METOPROLOL, INSULIN, TUBE FEEDINGS, AND
VARIOUS OTHERS. PT DOES HAVE A STAGE IV DECUB THAT WAS CAUSED FROM A 3 WEEK
STAY AT [facility] 3 YEARS AGO. PT WENT IN FOR A CAROTIDENDARTERECTOMY AND SUFFERED A
STROKE ON THE TABLE PER DAUGHTER. THE DECUB WAS CAUSED FROM THAT INITIAL STAY.
PT HAS HAD A RECENT PEG TUBE PLACED R/T DYSPHAGIA, AND HAS A CHRONIC BRAY
CATHETER. TELEMETRY PLACED ON PT SHOWS , METOPROLOL GIVEN. GLUCERNA 1.5
RUNNING @ 55MLS/HR WITH 50ML/HR FLUSH THROUGH PATENT PEG TUBE. PTS BED IS LOW,
CALL LIGHT IN REACH, DAUGHTER REMAINS AT BEDSIDE, HOB 35 DEGREES, SIDE RAILS X
2. PT HAD BILATERAL FOOT DROP WITH THE RIGHT BEING MORE PROMINENT THAN THE
LEFT. CONTINUE TO MONITOR CLOSELY. ALSO, PTS CHRONIC PAIN IS CURRENTLY NOT
MANAGED WELL THE PRN NORCO 7.5.

## 2017-03-22 NOTE — NUR
RESUMED CARE OF PT, FAMILY AT BEDSIDE, FEEDING TUBE GLUCERNA-1.5 @ 55ML, 50 ML
FLUSH, IV-L.HAND-NS@ 100, 1st MATTRESS, BED IS LOW, SRX3, CALL LIGHT IN REACH,
[facility]

## 2017-03-22 NOTE — NUR
WOUND CARE CONSULT:
FAMILIAR WITH THIS PT. 72 Y/0 FEMALE RESIDENT OF [facility] IN [place] WITH A
HISTORY OF CHRONIC PRESSURE INJURY ON COCCYX.  IT MEASURES 5CM X 7CM X 1CM.
WOUND BED IS PINK WITH YELLOW NECROTIC TISSUE.  NO ODOR IS NOTED.  CURRENT
TREATMENT IS SANTYL.  RECOMMEND CONTINUING.
ALSO NOTED A STAGE 1 PRESSURE INJURY TO RIGHT BUTTOCK MEASURING 3CM X 1CM.
NON-BLANCHING.
AIR OVERLAY MATTRESS PLACED ON BED.
PT IS BEING TURNED/REPOSITIONED Q2H.
HEELS BRIDGED.
SANTYL CONTINUED.
WOUND CARE WILL CONTINUE TO MONITOR.

## 2017-03-22 NOTE — NUR
PATIENT NON-VERBAL, ABLE TO SAY YES AND NO ONLY. DAUGHTER IN ROOM WITH
PATIENT5. TELEMTRY INTACT. BRAY CATH DRAING CLEAR YELLOW URINE. LEFT HAND
PHER. LINE HAS N/X AT 50CCF/HR. NPO. FEEDING TUBE. GLUCERNIA 1.5

## 2017-03-23 VITALS — DIASTOLIC BLOOD PRESSURE: 71 MMHG | SYSTOLIC BLOOD PRESSURE: 168 MMHG

## 2017-03-23 VITALS — DIASTOLIC BLOOD PRESSURE: 52 MMHG | SYSTOLIC BLOOD PRESSURE: 116 MMHG

## 2017-03-23 VITALS — DIASTOLIC BLOOD PRESSURE: 67 MMHG | SYSTOLIC BLOOD PRESSURE: 149 MMHG

## 2017-03-23 VITALS — SYSTOLIC BLOOD PRESSURE: 176 MMHG | DIASTOLIC BLOOD PRESSURE: 65 MMHG

## 2017-03-23 VITALS — SYSTOLIC BLOOD PRESSURE: 170 MMHG | DIASTOLIC BLOOD PRESSURE: 50 MMHG

## 2017-03-23 VITALS — DIASTOLIC BLOOD PRESSURE: 66 MMHG | SYSTOLIC BLOOD PRESSURE: 173 MMHG

## 2017-03-23 LAB
ANION GAP SERPL CALC-SCNC: 13.5 MMOL/L (ref 8–16)
APPEARANCE UR: CLEAR
BACTERIA #/AREA URNS HPF: (no result) /HPF
BASOPHILS NFR BLD AUTO: 0.6 % (ref 0–2)
BILIRUB SERPL-MCNC: NEGATIVE MG/DL
BUN SERPL-MCNC: 40 MG/DL (ref 7–18)
CALCIUM SERPL-MCNC: 8.9 MG/DL (ref 8.5–10.1)
CHLORIDE SERPL-SCNC: 103 MMOL/L (ref 98–107)
CO2 SERPL-SCNC: 27.4 MMOL/L (ref 21–32)
COLOR UR: YELLOW
CREAT SERPL-MCNC: 1.3 MG/DL (ref 0.6–1.3)
CREATININE - URINE: 23.5 MG/DL (ref 30–125)
EOSINOPHIL NFR BLD: 3.3 % (ref 0–7)
ERYTHROCYTE [DISTWIDTH] IN BLOOD BY AUTOMATED COUNT: 13.3 % (ref 11.5–14.5)
GLUCOSE SERPL-MCNC: 280 MG/DL (ref 74–106)
GLUCOSE SERPL-MCNC: NEGATIVE MG/DL
HCT VFR BLD CALC: 32 % (ref 36–48)
HGB BLD-MCNC: 10.1 G/DL (ref 12–16)
IMM GRANULOCYTES NFR BLD: 0.3 % (ref 0–5)
KETONES UR STRIP-MCNC: NEGATIVE MG/DL
LEUKOCYTE ESTERASE: (no result)
LYMPHOCYTES NFR BLD AUTO: 23.7 % (ref 15–50)
MCH RBC QN AUTO: 28.1 PG (ref 26–34)
MCHC RBC AUTO-ENTMCNC: 31.6 G/DL (ref 31–37)
MCV RBC: 88.9 FL (ref 80–100)
MONOCYTES NFR BLD: 5.1 % (ref 2–11)
NEUTROPHILS NFR BLD AUTO: 67 % (ref 40–80)
NITRITE UR-MCNC: NEGATIVE MG/ML
OSMOLALITY SERPL CALC.SUM OF ELEC: 297 MOSM/KG (ref 275–300)
PH UR STRIP: 8.5 [PH] (ref 5–6)
PLATELET # BLD: 306 10X3/UL (ref 130–400)
PMV BLD AUTO: 10.7 FL (ref 7.4–10.4)
POTASSIUM SERPL-SCNC: 4.9 MMOL/L (ref 3.5–5.1)
PROT UR-MCNC: (no result) MG/DL
PROT UR-MCNC: 53 MG/DL (ref 0–11.9)
PROT/CREAT UR: 2.3 MG/G
RBC # BLD AUTO: 3.6 10X6/UL (ref 4–5.4)
RBC #/AREA URNS HPF: (no result) /HPF (ref 0–5)
SODIUM SERPL-SCNC: 139 MMOL/L (ref 136–145)
SODIUM UR-SCNC: 67 MMOL/L (ref 20–110)
SP GR UR STRIP: 1 (ref 1–1.02)
SQUAMOUS #/AREA URNS HPF: (no result) /HPF (ref 0–5)
TRI-PHOS CRY #/AREA URNS HPF: (no result) /HPF
UROBILINOGEN UR-MCNC: NORMAL MG/DL
WBC # BLD AUTO: 8 10X3/UL (ref 4.8–10.8)
WBC #/AREA URNS HPF: (no result) /HPF (ref 0–5)

## 2017-03-23 NOTE — NUR
PT WAS RECEIVED AT THE BEGINNING OF THIS SHIFT IN BED AWAKE AND ORIENTED TO
SELF.  LEFT HAND HAS IV WITH NS GOING AT 100ML'S/HR RATE OF FLOW.  TUBE
FEEDING GLUCERNA 1.5 GOING AT 55ML'S/HR AND WATER FLUSHES Q 1 HOUR AT 50ML'S.
COLOSTOMY BAG ON AND IS SECURE.  BRAY CATHETER IS PATENT AND DRAINING YELLOW
URINE TO GRAVITY DRAINAGE BAG SYSTEM.  PT IS TOTAL CARE.  PT WILL BE TURNED
AND REPOSITIONED Q 2 HRS. FOR COMFORT AND CIRCULATION PURPOSES.  DAUGHTER AT
BEDSIDE.  NO SIGNS OF ANY DISCOMFORT OR DISTRESS.

## 2017-03-23 NOTE — NUR
PT RESTING IN BED WITH SON AT BEDSIDE. ALERT, BUT ONLY ANSWERS YES/NO AND THEN
SOMETIMES THAT IS NOT RELIABLE ACCORDING TO HER SON. SEE SHIFT ASSESSMENT.
CPOC.

## 2017-03-23 NOTE — NUR
PT. WAS GIVEN A NORCO 7.5MG AROUND 4:30PM PER REQUEST FOR HER BOTTOM AREA
HURTING.  NEW ORDER RECEIVED TO CHANGE FEEDING TO GLUCERNA 1.0 KATARZYNA AT 75ML/HR
AND FLUSH WITH 25ML OF WATER Q 1 HOUR.

## 2017-03-24 VITALS — SYSTOLIC BLOOD PRESSURE: 170 MMHG | DIASTOLIC BLOOD PRESSURE: 68 MMHG

## 2017-03-24 VITALS — SYSTOLIC BLOOD PRESSURE: 182 MMHG | DIASTOLIC BLOOD PRESSURE: 84 MMHG

## 2017-03-24 VITALS — DIASTOLIC BLOOD PRESSURE: 73 MMHG | SYSTOLIC BLOOD PRESSURE: 193 MMHG

## 2017-03-24 VITALS — DIASTOLIC BLOOD PRESSURE: 84 MMHG | SYSTOLIC BLOOD PRESSURE: 187 MMHG

## 2017-03-24 VITALS — SYSTOLIC BLOOD PRESSURE: 183 MMHG | DIASTOLIC BLOOD PRESSURE: 67 MMHG

## 2017-03-24 LAB
ALBUMIN SERPL-MCNC: 2.5 G/DL (ref 3.4–5)
ALP SERPL-CCNC: 64 U/L (ref 46–116)
ALT SERPL-CCNC: 25 U/L (ref 10–68)
ANION GAP SERPL CALC-SCNC: 12.3 MMOL/L (ref 8–16)
ANION GAP SERPL CALC-SCNC: 12.5 MMOL/L (ref 8–16)
APPEARANCE UR: CLEAR
BASOPHILS NFR BLD AUTO: 0.8 % (ref 0–2)
BILIRUB SERPL-MCNC: 0.18 MG/DL (ref 0.2–1.3)
BILIRUB SERPL-MCNC: NEGATIVE MG/DL
BUN SERPL-MCNC: 36 MG/DL (ref 7–18)
BUN SERPL-MCNC: 39 MG/DL (ref 7–18)
CALCIUM SERPL-MCNC: 8.9 MG/DL (ref 8.5–10.1)
CALCIUM SERPL-MCNC: 9.1 MG/DL (ref 8.5–10.1)
CHLORIDE SERPL-SCNC: 106 MMOL/L (ref 98–107)
CHLORIDE SERPL-SCNC: 107 MMOL/L (ref 98–107)
CO2 SERPL-SCNC: 27.8 MMOL/L (ref 21–32)
CO2 SERPL-SCNC: 28.1 MMOL/L (ref 21–32)
COLOR UR: YELLOW
CREAT SERPL-MCNC: 1.2 MG/DL (ref 0.6–1.3)
CREAT SERPL-MCNC: 1.3 MG/DL (ref 0.6–1.3)
EOSINOPHIL NFR BLD: 4.5 % (ref 0–7)
ERYTHROCYTE [DISTWIDTH] IN BLOOD BY AUTOMATED COUNT: 13.4 % (ref 11.5–14.5)
ERYTHROCYTE [SEDIMENTATION RATE] IN BLOOD: 48 MM/HR (ref 0–30)
GLOBULIN SER-MCNC: 4.9 G/L
GLUCOSE SERPL-MCNC: 189 MG/DL (ref 74–106)
GLUCOSE SERPL-MCNC: 210 MG/DL (ref 74–106)
GLUCOSE SERPL-MCNC: 50 MG/DL
HCT VFR BLD CALC: 31.2 % (ref 36–48)
HGB BLD-MCNC: 10.2 G/DL (ref 12–16)
IMM GRANULOCYTES NFR BLD: 0.1 % (ref 0–5)
KETONES UR STRIP-MCNC: NEGATIVE MG/DL
LEUKOCYTE ESTERASE: NEGATIVE
LYMPHOCYTES NFR BLD AUTO: 33.9 % (ref 15–50)
MCH RBC QN AUTO: 28.9 PG (ref 26–34)
MCHC RBC AUTO-ENTMCNC: 32.7 G/DL (ref 31–37)
MCV RBC: 88.4 FL (ref 80–100)
MONOCYTES NFR BLD: 6.1 % (ref 2–11)
NEUTROPHILS NFR BLD AUTO: 54.6 % (ref 40–80)
NITRITE UR-MCNC: NEGATIVE MG/ML
OSMOLALITY SERPL CALC.SUM OF ELEC: 296 MOSM/KG (ref 275–300)
OSMOLALITY SERPL CALC.SUM OF ELEC: 298 MOSM/KG (ref 275–300)
PH UR STRIP: 8 [PH] (ref 5–6)
PLATELET # BLD: 299 10X3/UL (ref 130–400)
PMV BLD AUTO: 10.8 FL (ref 7.4–10.4)
POTASSIUM SERPL-SCNC: 4.3 MMOL/L (ref 3.5–5.1)
POTASSIUM SERPL-SCNC: 4.4 MMOL/L (ref 3.5–5.1)
PREALB SERPL-MCNC: 28.3 MG/DL (ref 18–35.7)
PROT SERPL-MCNC: 7.4 G/DL (ref 6.4–8.2)
PROT UR-MCNC: (no result) MG/DL
RBC # BLD AUTO: 3.53 10X6/UL (ref 4–5.4)
SODIUM SERPL-SCNC: 142 MMOL/L (ref 136–145)
SODIUM SERPL-SCNC: 143 MMOL/L (ref 136–145)
SP GR UR STRIP: 1.01 (ref 1–1.02)
UROBILINOGEN UR-MCNC: NORMAL MG/DL
WBC # BLD AUTO: 7.3 10X3/UL (ref 4.8–10.8)

## 2017-03-24 PROCEDURE — 02HV33Z INSERTION OF INFUSION DEVICE INTO SUPERIOR VENA CAVA, PERCUTANEOUS APPROACH: ICD-10-PCS | Performed by: FAMILY MEDICINE

## 2017-03-24 PROCEDURE — B548ZZA ULTRASONOGRAPHY OF SUPERIOR VENA CAVA, GUIDANCE: ICD-10-PCS | Performed by: FAMILY MEDICINE

## 2017-03-24 NOTE — NUR
RECEIVED REPORT, FAMILY AT BEDSIDE, R.PICC-NS @100, PEG-1.0 GLUERNA-75,
FLUSH-25, DRESSING TO BOTTOM, BED IS LOW, 1st MATTRESS, SRX3, CALL LIGHT IN
REACH, WILL CONNTINUE TO MONITOR

## 2017-03-24 NOTE — NUR
Patient Name: INDU EID Admission Status: ER
Accout number: [id number] Admission Date: 2017
: 1945 Admission Diagnosis:ACUTE KIDNEY FAILURE, UNSPECIFIED
Attending: VIRI Current LOS: 3
 
Anticipated DC Date: 2017
Planned Disposition: Nursing Facility [facility] Cert
Primary Insurance: MEDICARE A & B
 
 
Discharge Planning Comments:
* Is the patient Alert and Oriented? Yes 0
* How many steps to enter\exit or inside your home? NONE 0
* PCP DR. ROBERT 0
* Pharmacy [facility] 0
* Preadmission Environment [facility] 0
* Facility Name [facility] 0
* ADLs Partial Dependent 0
* Partial ADLs (Assistance needed) Ambulation
Bathing
Dressing
Medication Management
Toileting
Transfers 0
* Equipment Other 0
* Other Equipment ALL EQUIPMENT PROVIDED BY SKILLED NURSING FACILITY 0
* List name and contact numbers for known caregivers / representatives who
currently or will assist patient after discharge: DIANNA MARTÍNEZ, DAUGHTER,
[phone]
* Community resources currently utilized None 0
* Please name any agencies selected above. NONE 0
* Additional services required to return to the preadmission environment? No 0
* Can the patient safely return to the preadmission environment? Yes 0
* Has this patient been hospitalized within the prior 30 days at any hospital?
No 0
 
CM MET WITH PT AND SON KENIA, IN ROOM TO DISCUSS DISCHARGE PLANNING AND NEEDS.
PT HAS SOME PROBLEMS WITH SPEECH, PT'S SON REPORTS PT HAS LIVED AT [facility]
FOR THE LAST THREE YEARS. PT INDICATES SHE IS HAPPY THERE AND FEELS SAFE. PT
IS DEPENDENT UPON NURSING HOME STAFF FOR HER CARE AND REQUIRES A LIFT FROM BED
TO WHEELCHAIR. PT INDICATES, SON REPORTS PT WILL RETURN TO [facility] FOR
CONTINUED LONG TERM CARE AT DISCHARGE. IMPORTANT MESSAGE FROM MEDICARE
PROVIDED AND EXPLAINED.
 
FOR DISCHARGE BACK TO [facility], NURSE REPORT TO BE CALLED TO [facility] AT
[phone], FAX DISCHARGE INFORMATION TO [facility] [phone]. PT TO
TRANSPORT VIA AMBULANCE.
 
: Yuniel Olivas

## 2017-03-24 NOTE — NUR
ORDER TO CHANGE BRAY. DC CURRENT BRAY 6CC STERILE FLUID EXTRACTED FROM
BALLOON. REMOVED WITHOUT PROBLEMS. STERILE TECHNIQUE INITIATED AND PLACED NEW
16F BRAY WITH 10 CC STERILE FLUID INTO BALLOON. COLLECTED URINE SPECIMEN FROM
CLEAN CATH ORDERED AND SENT TO LAB.

## 2017-03-25 VITALS — SYSTOLIC BLOOD PRESSURE: 185 MMHG | DIASTOLIC BLOOD PRESSURE: 88 MMHG

## 2017-03-25 VITALS — SYSTOLIC BLOOD PRESSURE: 211 MMHG | DIASTOLIC BLOOD PRESSURE: 97 MMHG

## 2017-03-25 VITALS — DIASTOLIC BLOOD PRESSURE: 83 MMHG | SYSTOLIC BLOOD PRESSURE: 187 MMHG

## 2017-03-25 VITALS — DIASTOLIC BLOOD PRESSURE: 83 MMHG | SYSTOLIC BLOOD PRESSURE: 193 MMHG

## 2017-03-25 VITALS — DIASTOLIC BLOOD PRESSURE: 75 MMHG | SYSTOLIC BLOOD PRESSURE: 160 MMHG

## 2017-03-25 LAB
ANION GAP SERPL CALC-SCNC: 13.9 MMOL/L (ref 8–16)
BASOPHILS NFR BLD AUTO: 0.5 % (ref 0–2)
BUN SERPL-MCNC: 25 MG/DL (ref 7–18)
CALCIUM SERPL-MCNC: 8.5 MG/DL (ref 8.5–10.1)
CHLORIDE SERPL-SCNC: 109 MMOL/L (ref 98–107)
CO2 SERPL-SCNC: 26.1 MMOL/L (ref 21–32)
CREAT SERPL-MCNC: 1.1 MG/DL (ref 0.6–1.3)
EOSINOPHIL NFR BLD: 2.4 % (ref 0–7)
ERYTHROCYTE [DISTWIDTH] IN BLOOD BY AUTOMATED COUNT: 13.6 % (ref 11.5–14.5)
GLUCOSE SERPL-MCNC: 268 MG/DL (ref 74–106)
HCT VFR BLD CALC: 28.8 % (ref 36–48)
HGB BLD-MCNC: 9.3 G/DL (ref 12–16)
IMM GRANULOCYTES NFR BLD: 0.3 % (ref 0–5)
LYMPHOCYTES NFR BLD AUTO: 26.9 % (ref 15–50)
MCH RBC QN AUTO: 28.4 PG (ref 26–34)
MCHC RBC AUTO-ENTMCNC: 32.3 G/DL (ref 31–37)
MCV RBC: 87.8 FL (ref 80–100)
MONOCYTES NFR BLD: 5.8 % (ref 2–11)
NEUTROPHILS NFR BLD AUTO: 64.1 % (ref 40–80)
OSMOLALITY SERPL CALC.SUM OF ELEC: 301 MOSM/KG (ref 275–300)
PLATELET # BLD: 295 10X3/UL (ref 130–400)
PMV BLD AUTO: 10.6 FL (ref 7.4–10.4)
POTASSIUM SERPL-SCNC: 4 MMOL/L (ref 3.5–5.1)
RBC # BLD AUTO: 3.28 10X6/UL (ref 4–5.4)
SODIUM SERPL-SCNC: 145 MMOL/L (ref 136–145)
WBC # BLD AUTO: 7.8 10X3/UL (ref 4.8–10.8)

## 2017-03-25 NOTE — NUR
TURNED AND REPOSITIONED. WOUND CARE PROVIDED TO SACRAL/COCCYX WOUND AS DRAINGE
SOAKED THROUGH MEPILEX. TOLERATED WOUND CARE WELL. NO DISTRESS. CALL LIGHT
WITHIN REACH.

## 2017-03-25 NOTE — NUR
RECEIVED REPORT. ASSUMED CARE OF PATIENT. CALL LIGHT WITHIN REACH. FAMILY AT
BEDSIDE. 1ST STEP OVERLAY PATENT. RESP EVEN AND UNLABORED. PATIENT SLIGHTLY
ANXIOUS ABOUT BONE SCAN TODAY. GINGER FROM RADIOLOGY AT BEDSIDE FOR BLOOD DRAW
FOR SPECIAL PROCEDURE TODAY AND EXPLAINING TIME FRAME AND DETAILS OF TEST TO
FAMILY AND PATIENT.  TUBE FEEDING PATENT. DENIES NEEDS AT THIS TIME. NO
DISTRESS.

## 2017-03-25 NOTE — NUR
SITTING UP IN BED, AWAKE AND ALERT, SKIN WARM AND DRY, RESP UNLABORED, IV
PATENT TO RIGHT PICC, PEG TUBE INTACT WITH GLUCERNA INFUSING AT 75CC/HR,
COLOSTOMY BAG FULL OF SOFT BROWN STOOL, EMPTIED AT THIS TIME, NO DISTRESS
NOTED

## 2017-03-25 NOTE — NUR
PATIENT LEFT UNIT FOR RADIOLOGY TO HAVE RADIUM INFUSED BLOOD INFUSED BACK INTO
PATIENT FOR BONE SCAN THIS AFTERNOON. NO DISTRESS.

## 2017-03-25 NOTE — NUR
DAUGHTER IN LAW AT BEDSIDE WASHING PATIENTS HAIR. NO DISTRESS. CALL LIGHT
WITHIN REACH. PATIENT ENJOYING HER HAIR BEING WASHED.

## 2017-03-26 VITALS — SYSTOLIC BLOOD PRESSURE: 188 MMHG | DIASTOLIC BLOOD PRESSURE: 84 MMHG

## 2017-03-26 VITALS — DIASTOLIC BLOOD PRESSURE: 82 MMHG | SYSTOLIC BLOOD PRESSURE: 201 MMHG

## 2017-03-26 VITALS — SYSTOLIC BLOOD PRESSURE: 188 MMHG | DIASTOLIC BLOOD PRESSURE: 88 MMHG

## 2017-03-26 VITALS — DIASTOLIC BLOOD PRESSURE: 80 MMHG | SYSTOLIC BLOOD PRESSURE: 180 MMHG

## 2017-03-26 VITALS — SYSTOLIC BLOOD PRESSURE: 181 MMHG | DIASTOLIC BLOOD PRESSURE: 81 MMHG

## 2017-03-26 VITALS — SYSTOLIC BLOOD PRESSURE: 196 MMHG | DIASTOLIC BLOOD PRESSURE: 85 MMHG

## 2017-03-26 VITALS — SYSTOLIC BLOOD PRESSURE: 182 MMHG | DIASTOLIC BLOOD PRESSURE: 74 MMHG

## 2017-03-26 LAB
ANION GAP SERPL CALC-SCNC: 12.3 MMOL/L (ref 8–16)
BASOPHILS NFR BLD AUTO: 0.9 % (ref 0–2)
BUN SERPL-MCNC: 23 MG/DL (ref 7–18)
CALCIUM SERPL-MCNC: 9.2 MG/DL (ref 8.5–10.1)
CHLORIDE SERPL-SCNC: 107 MMOL/L (ref 98–107)
CO2 SERPL-SCNC: 27.5 MMOL/L (ref 21–32)
CREAT SERPL-MCNC: 1.2 MG/DL (ref 0.6–1.3)
EOSINOPHIL NFR BLD: 3.2 % (ref 0–7)
ERYTHROCYTE [DISTWIDTH] IN BLOOD BY AUTOMATED COUNT: 13.4 % (ref 11.5–14.5)
GLUCOSE SERPL-MCNC: 211 MG/DL (ref 74–106)
HCT VFR BLD CALC: 32.3 % (ref 36–48)
HGB BLD-MCNC: 10.1 G/DL (ref 12–16)
IMM GRANULOCYTES NFR BLD: 0.1 % (ref 0–5)
LYMPHOCYTES NFR BLD AUTO: 32.3 % (ref 15–50)
MCH RBC QN AUTO: 27.7 PG (ref 26–34)
MCHC RBC AUTO-ENTMCNC: 31.3 G/DL (ref 31–37)
MCV RBC: 88.7 FL (ref 80–100)
MONOCYTES NFR BLD: 6.3 % (ref 2–11)
NEUTROPHILS NFR BLD AUTO: 57.2 % (ref 40–80)
OSMOLALITY SERPL CALC.SUM OF ELEC: 294 MOSM/KG (ref 275–300)
PLATELET # BLD: 322 10X3/UL (ref 130–400)
PMV BLD AUTO: 10.6 FL (ref 7.4–10.4)
POTASSIUM SERPL-SCNC: 3.8 MMOL/L (ref 3.5–5.1)
RBC # BLD AUTO: 3.64 10X6/UL (ref 4–5.4)
SODIUM SERPL-SCNC: 143 MMOL/L (ref 136–145)
WBC # BLD AUTO: 9 10X3/UL (ref 4.8–10.8)

## 2017-03-26 NOTE — NUR
PT RECEIVED IN BED WITH EYES OPEN WATCHING TV AND SON AT BEDSIDE. NO
SIGN/SYMPTOMS OF DISTRESS NOTED. RECEIVED MEDICATIONS CRUSHED VIA PEG TUBE PER
MAR WITHOUT DIFFICULTY. CONTINOUS FEEDING RUNNING GLUCERNA AT 75ML/HR. CALL
LIGHT IN REACH.

## 2017-03-26 NOTE — NUR
FSBS 206.  4 UNITS HUMULIN ADMINISTERED PER SLIDING SCALE. TURNED AND
REPOSITIONED. CALL LIGHT WITHIN REACH. NO DISTRESS.

## 2017-03-26 NOTE — NUR
RECEIVED REPORT. ASSUMED CARE OF PATIENT. CALL LIGHT WITHIN REACH. FAMILY AT
BEDSIDE. IV FLUIDS AND TUBE FEEDING INFUSING AS ORDERED. 1ST STEP OVERLAY
PATENT. COMMUNICATION DEFICT NOTED DUE TO SPEECH, THIS IS NOT NEW. DENIES
NEEDS. NO DISTRESS.

## 2017-03-27 VITALS — DIASTOLIC BLOOD PRESSURE: 74 MMHG | SYSTOLIC BLOOD PRESSURE: 194 MMHG

## 2017-03-27 VITALS — DIASTOLIC BLOOD PRESSURE: 59 MMHG | SYSTOLIC BLOOD PRESSURE: 145 MMHG

## 2017-03-27 VITALS — SYSTOLIC BLOOD PRESSURE: 139 MMHG | DIASTOLIC BLOOD PRESSURE: 59 MMHG

## 2017-03-27 VITALS — SYSTOLIC BLOOD PRESSURE: 190 MMHG | DIASTOLIC BLOOD PRESSURE: 70 MMHG

## 2017-03-27 VITALS — SYSTOLIC BLOOD PRESSURE: 187 MMHG | DIASTOLIC BLOOD PRESSURE: 82 MMHG

## 2017-03-27 VITALS — DIASTOLIC BLOOD PRESSURE: 79 MMHG | SYSTOLIC BLOOD PRESSURE: 177 MMHG

## 2017-03-27 LAB
ANION GAP SERPL CALC-SCNC: 10.8 MMOL/L (ref 8–16)
BASOPHILS NFR BLD AUTO: 0.7 % (ref 0–2)
BUN SERPL-MCNC: 23 MG/DL (ref 7–18)
CALCIUM SERPL-MCNC: 8.8 MG/DL (ref 8.5–10.1)
CHLORIDE SERPL-SCNC: 107 MMOL/L (ref 98–107)
CO2 SERPL-SCNC: 27.8 MMOL/L (ref 21–32)
CREAT SERPL-MCNC: 1.1 MG/DL (ref 0.6–1.3)
EOSINOPHIL NFR BLD: 4.6 % (ref 0–7)
ERYTHROCYTE [DISTWIDTH] IN BLOOD BY AUTOMATED COUNT: 13.4 % (ref 11.5–14.5)
GLUCOSE SERPL-MCNC: 173 MG/DL (ref 74–106)
HCT VFR BLD CALC: 29.4 % (ref 36–48)
HGB BLD-MCNC: 9.3 G/DL (ref 12–16)
IMM GRANULOCYTES NFR BLD: 0.1 % (ref 0–5)
LYMPHOCYTES NFR BLD AUTO: 32.7 % (ref 15–50)
MCH RBC QN AUTO: 27.9 PG (ref 26–34)
MCHC RBC AUTO-ENTMCNC: 31.6 G/DL (ref 31–37)
MCV RBC: 88.3 FL (ref 80–100)
MONOCYTES NFR BLD: 7 % (ref 2–11)
NEUTROPHILS NFR BLD AUTO: 54.9 % (ref 40–80)
OSMOLALITY SERPL CALC.SUM OF ELEC: 290 MOSM/KG (ref 275–300)
PLATELET # BLD: 301 10X3/UL (ref 130–400)
PMV BLD AUTO: 10.4 FL (ref 7.4–10.4)
POTASSIUM SERPL-SCNC: 3.6 MMOL/L (ref 3.5–5.1)
RBC # BLD AUTO: 3.33 10X6/UL (ref 4–5.4)
SODIUM SERPL-SCNC: 142 MMOL/L (ref 136–145)
WBC # BLD AUTO: 8.6 10X3/UL (ref 4.8–10.8)

## 2017-03-27 NOTE — NUR
PT IS ALERT. ASSESSMENT DONE PER FLOWSHEET. NO OTHER NEEDS AT THIS TIME. WILL
CONTINUE TO MONITOR. NO OTHER NEEDS.

## 2017-03-27 NOTE — NUR
PT IN BED WITH EYES OPEN WITH NO CONCERNS NOTED AT THIS TIME. MEDICATIONS
GIVEN PER MAR VIA PEG TUBE WITHOUT DIFFICULTY. FSBS 188 WITH 2 UNITS OF
HUMULIN R GIVEN PER SLIDING SCALE. CALL LIGHT IN REACH.

## 2017-03-28 VITALS — SYSTOLIC BLOOD PRESSURE: 144 MMHG | DIASTOLIC BLOOD PRESSURE: 54 MMHG

## 2017-03-28 VITALS — SYSTOLIC BLOOD PRESSURE: 119 MMHG | DIASTOLIC BLOOD PRESSURE: 56 MMHG

## 2017-03-28 VITALS — DIASTOLIC BLOOD PRESSURE: 76 MMHG | SYSTOLIC BLOOD PRESSURE: 143 MMHG

## 2017-03-28 VITALS — SYSTOLIC BLOOD PRESSURE: 145 MMHG | DIASTOLIC BLOOD PRESSURE: 62 MMHG

## 2017-03-28 VITALS — SYSTOLIC BLOOD PRESSURE: 178 MMHG | DIASTOLIC BLOOD PRESSURE: 73 MMHG

## 2017-03-28 LAB
ANION GAP SERPL CALC-SCNC: 11.6 MMOL/L (ref 8–16)
BASOPHILS NFR BLD AUTO: 0.3 % (ref 0–2)
BUN SERPL-MCNC: 25 MG/DL (ref 7–18)
CALCIUM SERPL-MCNC: 8.6 MG/DL (ref 8.5–10.1)
CHLORIDE SERPL-SCNC: 106 MMOL/L (ref 98–107)
CO2 SERPL-SCNC: 26 MMOL/L (ref 21–32)
CREAT SERPL-MCNC: 1.3 MG/DL (ref 0.6–1.3)
EOSINOPHIL NFR BLD: 3.7 % (ref 0–7)
ERYTHROCYTE [DISTWIDTH] IN BLOOD BY AUTOMATED COUNT: 13.7 % (ref 11.5–14.5)
GLUCOSE SERPL-MCNC: 287 MG/DL (ref 74–106)
HCT VFR BLD CALC: 30.1 % (ref 36–48)
HGB BLD-MCNC: 9.7 G/DL (ref 12–16)
IMM GRANULOCYTES NFR BLD: 0.1 % (ref 0–5)
LYMPHOCYTES NFR BLD AUTO: 9.3 % (ref 15–50)
MCH RBC QN AUTO: 28.4 PG (ref 26–34)
MCHC RBC AUTO-ENTMCNC: 32.2 G/DL (ref 31–37)
MCV RBC: 88.3 FL (ref 80–100)
MONOCYTES NFR BLD: 4.1 % (ref 2–11)
NEUTROPHILS NFR BLD AUTO: 82.5 % (ref 40–80)
OSMOLALITY SERPL CALC.SUM OF ELEC: 292 MOSM/KG (ref 275–300)
PLATELET # BLD: 291 10X3/UL (ref 130–400)
PMV BLD AUTO: 10.8 FL (ref 7.4–10.4)
POTASSIUM SERPL-SCNC: 3.6 MMOL/L (ref 3.5–5.1)
RBC # BLD AUTO: 3.41 10X6/UL (ref 4–5.4)
SODIUM SERPL-SCNC: 140 MMOL/L (ref 136–145)
WBC # BLD AUTO: 8.7 10X3/UL (ref 4.8–10.8)

## 2017-03-28 NOTE — NUR
Patient Name: INDU EID
Encounter No: [id number]
: 1945
Primary Insurance: MEDICARE A & B
Anticipated DC Date: 2017
Planned Disposition: Nursing Facility [facility] Cert
External Planned Provider: [facility]
 
 
DCP follow-up note: CM RECEIVED CALL FROM [facility], CLINICAL LIAISON FOR [facility]. CM PROVIDED UPDATE AND NEED FOR IV ANTIBIOTICS AND REQUESTED TO KNOW IF
THIS CAN BE DONE AT THE NURSING HOME. CM FAXED UPDATE WITH CURRENT MAR TO
[facility] OF [facility], [phone]. CM CALLED [facility], [phone], SPOKE TO
ARISTEO WHO WILL HAVE MAY CALL  SHORTLY AFTER FAX REVIEW.
 
CM SPOKE TO PT AND SON IN ROOM, BOTH IN AGREEMENT WITH DISCHARGE BACK TO [facility] AS SOON AS POSSIBLE AND IS WILLING FOR IV MEDICATION THERE. IMPORTANT
MESSAGE FROM MEDICARE PROVIDED AND EXPLAINED.
 
CM WAITING TO SEE IF [facility] CAN PROVIDE IV ANTIBIOTICS AS ORDERED. IF
ACCEPTED, NURSE REPORT TO BE CALLED TO [facility] [phone], FAX
DISCHARGE INFORMATION TO [facility] [phone]. PT TO TRANSPORT VIA AMBULANCE.
 
: Yuniel Olivas

## 2017-03-28 NOTE — NUR
NURSE ROUNDS 21:00 - PT LYING IN BED, AWAKE, ALERT, FAMILY AT BEDSIDE. PT
DENIES ANY NEEDS. CONTINUE TO MONITOR CLOSELY.

## 2017-03-28 NOTE — NUR
PATIENT LEFT VIA EMS AT THIS TIME IN NO ACUTE DISTRESS. PATIENT BEING
DISCHARGED BACK TO [facility] IN [place].

## 2017-03-28 NOTE — NUR
FSBS 238. 4 UNITS INSULIN ADMINISTERED PER SLIDING SCALE. NO DISTRESS.
COLOSTOMY EMPTIED AT THIS TIME.

## 2017-03-28 NOTE — NUR
20 GAUGE IV SITE TO LEFT HAND REMOVED. CATHETER TIP INTACT. NO BLEEDING FROM
SITE. 2X2 GAUZE APPLIED AND SECURED WITH TAPE. TOLERATED IV REMOVAL WELL.
PATIENT DISCHARGING TO [facility].

## 2017-03-28 NOTE — NUR
PT LYING IN BED, EYES CLOSED, RESPIRATIONS EVEN AND UNLABORED. PT EASILY
ROUSABLE TO VERBAL STIMULI. IV TUBING CHANGED TO NS, PEG FLUSHES EASILY AND
INFUSING PROPERLY. PT TURNED TO LEFT SIDE. CONTINUE TO MONITOR CLOSELY. FAMILY
AT BEDSIDE.

## 2017-03-28 NOTE — NUR
RECEIVED REPORT. ASSUMED CARE OF PATIENT. CALL LIGHT WITHIN REACH. RESTING
WITH EYES CLOSED, EASILY AROUSED, PATIENTS SON AT BEDSIDE. 1ST STEP OVERLAY
PATENT. IV FLUIDS INFUSING AS ORDERED. RESP EVEN AND UNLABORED. NO DISTRESS.

## 2017-03-28 NOTE — NUR
Patient Name: INDU EID
Encounter No: [id number]
: 1945
Primary Insurance: MEDICARE A & B
Anticipated DC Date: 2017
Planned Disposition: Nursing Facility [facility]
External Planned Provider: [facility], MEDICARE REHAB BED
 
 
DCP follow-up note: CM RECEIVED CALL FROM [facility]
[place] , THEY CAN PROVIDE IV ANTIBIOTICS AS ORDERED AND WILL ACCEPT TODAY. CM
NOTIFIED BEDSIDE NURSE, PT AND SON IN ROOM AND FEDERICO SANTOS. CM FAXED DISCHARGE
INFORMATION TO [facility] [phone]
 
NURSE REPORT TO BE CALLED TO [facility] [phone]. PT TO TRANSPORT VIA
AMBULANCE.
 
: Yuniel Olivas

## 2017-03-28 NOTE — NUR
REPORT CALLED TO TENISHA CEVALLOS AT [facility] IN [place].

## 2017-05-26 ENCOUNTER — HOSPITAL ENCOUNTER (OUTPATIENT)
Dept: HOSPITAL 84 - D.RAD | Age: 72
Discharge: HOME | End: 2017-05-26
Attending: INTERNAL MEDICINE
Payer: MEDICARE

## 2017-05-26 VITALS — BODY MASS INDEX: 34 KG/M2

## 2017-05-26 DIAGNOSIS — R13.12: Primary | ICD-10-CM

## 2018-02-01 ENCOUNTER — HOSPITAL ENCOUNTER (EMERGENCY)
Dept: HOSPITAL 84 - D.ER | Age: 73
LOS: 1 days | Discharge: HOME | End: 2018-02-02
Payer: MEDICARE

## 2018-02-01 DIAGNOSIS — Z86.73: ICD-10-CM

## 2018-02-01 DIAGNOSIS — Z93.3: ICD-10-CM

## 2018-02-01 DIAGNOSIS — K21.9: ICD-10-CM

## 2018-02-01 DIAGNOSIS — E11.9: ICD-10-CM

## 2018-02-01 DIAGNOSIS — N39.0: Primary | ICD-10-CM

## 2018-02-01 DIAGNOSIS — I10: ICD-10-CM

## 2018-02-01 DIAGNOSIS — K92.1: ICD-10-CM

## 2018-02-01 LAB
ALBUMIN SERPL-MCNC: 3.3 G/DL (ref 3.4–5)
ALP SERPL-CCNC: 84 U/L (ref 46–116)
ALT SERPL-CCNC: 26 U/L (ref 10–68)
AMORPHOUS SEDIMENT: (no result) /LPF
ANION GAP SERPL CALC-SCNC: 12 MMOL/L (ref 8–16)
APPEARANCE UR: (no result)
BACTERIA #/AREA URNS HPF: (no result) /HPF
BASOPHILS NFR BLD AUTO: 0.2 % (ref 0–2)
BILIRUB SERPL-MCNC: 0.39 MG/DL (ref 0.2–1.3)
BILIRUB SERPL-MCNC: NEGATIVE MG/DL
BUN SERPL-MCNC: 43 MG/DL (ref 7–18)
CALCIUM SERPL-MCNC: 9.9 MG/DL (ref 8.5–10.1)
CHLORIDE SERPL-SCNC: 97 MMOL/L (ref 98–107)
CK SERPL-CCNC: 58 UL (ref 21–215)
CO2 SERPL-SCNC: 29.5 MMOL/L (ref 21–32)
COLOR UR: YELLOW
CREAT SERPL-MCNC: 1.8 MG/DL (ref 0.6–1.3)
EOSINOPHIL NFR BLD: 0.2 % (ref 0–7)
ERYTHROCYTE [DISTWIDTH] IN BLOOD BY AUTOMATED COUNT: 12.9 % (ref 11.5–14.5)
GLOBULIN SER-MCNC: 5.6 G/L
GLUCOSE SERPL-MCNC: 1000 MG/DL
GLUCOSE SERPL-MCNC: 343 MG/DL (ref 74–106)
HCT VFR BLD CALC: 35.9 % (ref 36–48)
HGB BLD-MCNC: 12.1 G/DL (ref 12–16)
IMM GRANULOCYTES NFR BLD: 0.2 % (ref 0–5)
INR PPP: 1.07 (ref 0.85–1.17)
KETONES UR STRIP-MCNC: NEGATIVE MG/DL
LYMPHOCYTES NFR BLD AUTO: 26 % (ref 15–50)
MAGNESIUM SERPL-MCNC: 2.1 MG/DL (ref 1.8–2.4)
MCH RBC QN AUTO: 28.3 PG (ref 26–34)
MCHC RBC AUTO-ENTMCNC: 33.7 G/DL (ref 31–37)
MCV RBC: 84.1 FL (ref 80–100)
MONOCYTES NFR BLD: 4.9 % (ref 2–11)
NEUTROPHILS NFR BLD AUTO: 68.5 % (ref 40–80)
NITRITE UR-MCNC: POSITIVE MG/ML
NT-PROBNP SERPL-MCNC: 2237 PG/ML (ref 0–125)
OSMOLALITY SERPL CALC.SUM OF ELEC: 292 MOSM/KG (ref 275–300)
PH UR STRIP: 8 [PH] (ref 5–6)
PLATELET # BLD: 316 10X3/UL (ref 130–400)
PMV BLD AUTO: 10.1 FL (ref 7.4–10.4)
POTASSIUM SERPL-SCNC: 4.5 MMOL/L (ref 3.5–5.1)
PROT SERPL-MCNC: 8.9 G/DL (ref 6.4–8.2)
PROT UR-MCNC: (no result) MG/DL
PROTHROMBIN TIME: 13.5 SECONDS (ref 11.6–15)
RBC # BLD AUTO: 4.27 10X6/UL (ref 4–5.4)
RBC #/AREA URNS HPF: (no result) /HPF (ref 0–5)
SODIUM SERPL-SCNC: 134 MMOL/L (ref 136–145)
SP GR UR STRIP: 1 (ref 1–1.02)
UROBILINOGEN UR-MCNC: NORMAL MG/DL
WBC # BLD AUTO: 12.7 10X3/UL (ref 4.8–10.8)
WBC #/AREA URNS HPF: (no result) /HPF (ref 0–5)

## 2018-08-09 ENCOUNTER — HOSPITAL ENCOUNTER (OUTPATIENT)
Dept: HOSPITAL 84 - D.RAD | Age: 73
Discharge: HOME | End: 2018-08-09
Attending: INTERNAL MEDICINE
Payer: MEDICARE

## 2018-08-09 VITALS — BODY MASS INDEX: 34 KG/M2

## 2018-08-09 DIAGNOSIS — R13.12: Primary | ICD-10-CM

## 2018-10-08 ENCOUNTER — HOSPITAL ENCOUNTER (OUTPATIENT)
Dept: HOSPITAL 84 - D.RAD | Age: 73
Discharge: HOME | End: 2018-10-08
Attending: INTERNAL MEDICINE
Payer: MEDICARE

## 2018-10-08 VITALS — BODY MASS INDEX: 34 KG/M2

## 2018-10-08 DIAGNOSIS — R13.12: Primary | ICD-10-CM

## 2019-01-08 ENCOUNTER — HOSPITAL ENCOUNTER (INPATIENT)
Dept: HOSPITAL 84 - D.ER | Age: 74
LOS: 3 days | Discharge: NURSING, REHAB OR LONG-TERM CARE | DRG: 682 | End: 2019-01-11
Attending: FAMILY MEDICINE | Admitting: FAMILY MEDICINE
Payer: MEDICARE

## 2019-01-08 VITALS — BODY MASS INDEX: 30.73 KG/M2 | WEIGHT: 180 LBS | HEIGHT: 64 IN | BODY MASS INDEX: 30.73 KG/M2

## 2019-01-08 DIAGNOSIS — E87.1: ICD-10-CM

## 2019-01-08 DIAGNOSIS — I10: ICD-10-CM

## 2019-01-08 DIAGNOSIS — N39.0: ICD-10-CM

## 2019-01-08 DIAGNOSIS — I69.321: ICD-10-CM

## 2019-01-08 DIAGNOSIS — N17.9: Primary | ICD-10-CM

## 2019-01-08 DIAGNOSIS — L89.154: ICD-10-CM

## 2019-01-08 DIAGNOSIS — T83.511A: ICD-10-CM

## 2019-01-08 DIAGNOSIS — E11.9: ICD-10-CM

## 2019-01-08 LAB
ALBUMIN SERPL-MCNC: 3.1 G/DL (ref 3.4–5)
ALP SERPL-CCNC: 55 U/L (ref 46–116)
ALT SERPL-CCNC: 25 U/L (ref 10–68)
ANION GAP SERPL CALC-SCNC: 11.6 MMOL/L (ref 8–16)
APPEARANCE UR: (no result)
BACTERIA #/AREA URNS HPF: (no result) /HPF
BASOPHILS NFR BLD AUTO: 0.4 % (ref 0–2)
BILIRUB SERPL-MCNC: 0.25 MG/DL (ref 0.2–1.3)
BILIRUB SERPL-MCNC: NEGATIVE MG/DL
BUN SERPL-MCNC: 104 MG/DL (ref 7–18)
CALCIUM SERPL-MCNC: 10 MG/DL (ref 8.5–10.1)
CHLORIDE SERPL-SCNC: 95 MMOL/L (ref 98–107)
CO2 SERPL-SCNC: 30.2 MMOL/L (ref 21–32)
COLOR UR: (no result)
CREAT SERPL-MCNC: 2.8 MG/DL (ref 0.6–1.3)
EOSINOPHIL NFR BLD: 4.3 % (ref 0–7)
ERYTHROCYTE [DISTWIDTH] IN BLOOD BY AUTOMATED COUNT: 13.1 % (ref 11.5–14.5)
GLOBULIN SER-MCNC: 5.4 G/L
GLUCOSE SERPL-MCNC: 251 MG/DL (ref 74–106)
GLUCOSE SERPL-MCNC: NEGATIVE MG/DL
HCT VFR BLD CALC: 32.9 % (ref 36–48)
HGB BLD-MCNC: 11 G/DL (ref 12–16)
IMM GRANULOCYTES NFR BLD: 0.2 % (ref 0–5)
KETONES UR STRIP-MCNC: NEGATIVE MG/DL
LYMPHOCYTES NFR BLD AUTO: 35.7 % (ref 15–50)
MCH RBC QN AUTO: 29.1 PG (ref 26–34)
MCHC RBC AUTO-ENTMCNC: 33.4 G/DL (ref 31–37)
MCV RBC: 87 FL (ref 80–100)
MONOCYTES NFR BLD: 3.9 % (ref 2–11)
NEUTROPHILS NFR BLD AUTO: 55.5 % (ref 40–80)
NITRITE UR-MCNC: POSITIVE MG/ML
OSMOLALITY SERPL CALC.SUM OF ELEC: 305 MOSM/KG (ref 275–300)
PH UR STRIP: 8 [PH] (ref 5–6)
PLATELET # BLD: 287 10X3/UL (ref 130–400)
PMV BLD AUTO: 10.7 FL (ref 7.4–10.4)
POTASSIUM SERPL-SCNC: 4.8 MMOL/L (ref 3.5–5.1)
PROT SERPL-MCNC: 8.5 G/DL (ref 6.4–8.2)
PROT UR-MCNC: (no result) MG/DL
RBC # BLD AUTO: 3.78 10X6/UL (ref 4–5.4)
RBC #/AREA URNS HPF: (no result) /HPF (ref 0–5)
SODIUM SERPL-SCNC: 132 MMOL/L (ref 136–145)
SP GR UR STRIP: 1 (ref 1–1.02)
SQUAMOUS #/AREA URNS HPF: (no result) /HPF (ref 0–5)
UROBILINOGEN UR-MCNC: NORMAL MG/DL
WBC # BLD AUTO: 11.8 10X3/UL (ref 4.8–10.8)
WBC #/AREA URNS HPF: (no result) /HPF (ref 0–5)

## 2019-01-09 VITALS — DIASTOLIC BLOOD PRESSURE: 64 MMHG | SYSTOLIC BLOOD PRESSURE: 161 MMHG

## 2019-01-09 VITALS — SYSTOLIC BLOOD PRESSURE: 186 MMHG | DIASTOLIC BLOOD PRESSURE: 57 MMHG

## 2019-01-09 VITALS — SYSTOLIC BLOOD PRESSURE: 183 MMHG | DIASTOLIC BLOOD PRESSURE: 57 MMHG

## 2019-01-09 VITALS — DIASTOLIC BLOOD PRESSURE: 56 MMHG | SYSTOLIC BLOOD PRESSURE: 188 MMHG

## 2019-01-09 VITALS — DIASTOLIC BLOOD PRESSURE: 64 MMHG | SYSTOLIC BLOOD PRESSURE: 175 MMHG

## 2019-01-09 VITALS — SYSTOLIC BLOOD PRESSURE: 153 MMHG | DIASTOLIC BLOOD PRESSURE: 98 MMHG

## 2019-01-09 LAB
ANION GAP SERPL CALC-SCNC: 14.2 MMOL/L (ref 8–16)
BASOPHILS NFR BLD AUTO: 0.4 % (ref 0–2)
BUN SERPL-MCNC: 96 MG/DL (ref 7–18)
CALCIUM SERPL-MCNC: 9.9 MG/DL (ref 8.5–10.1)
CHLORIDE SERPL-SCNC: 100 MMOL/L (ref 98–107)
CO2 SERPL-SCNC: 28.2 MMOL/L (ref 21–32)
CREAT SERPL-MCNC: 2.4 MG/DL (ref 0.6–1.3)
EOSINOPHIL NFR BLD: 3.8 % (ref 0–7)
ERYTHROCYTE [DISTWIDTH] IN BLOOD BY AUTOMATED COUNT: 13 % (ref 11.5–14.5)
GLUCOSE SERPL-MCNC: 166 MG/DL (ref 74–106)
HCT VFR BLD CALC: 29.9 % (ref 36–48)
HGB BLD-MCNC: 10 G/DL (ref 12–16)
IMM GRANULOCYTES NFR BLD: 0.2 % (ref 0–5)
LYMPHOCYTES NFR BLD AUTO: 33.5 % (ref 15–50)
MCH RBC QN AUTO: 28.9 PG (ref 26–34)
MCHC RBC AUTO-ENTMCNC: 33.4 G/DL (ref 31–37)
MCV RBC: 86.4 FL (ref 80–100)
MONOCYTES NFR BLD: 5.1 % (ref 2–11)
NEUTROPHILS NFR BLD AUTO: 57 % (ref 40–80)
OSMOLALITY SERPL CALC.SUM OF ELEC: 309 MOSM/KG (ref 275–300)
PLATELET # BLD: 285 10X3/UL (ref 130–400)
PMV BLD AUTO: 10.8 FL (ref 7.4–10.4)
POTASSIUM SERPL-SCNC: 4.4 MMOL/L (ref 3.5–5.1)
RBC # BLD AUTO: 3.46 10X6/UL (ref 4–5.4)
SODIUM SERPL-SCNC: 138 MMOL/L (ref 136–145)
WBC # BLD AUTO: 10.7 10X3/UL (ref 4.8–10.8)

## 2019-01-10 VITALS — DIASTOLIC BLOOD PRESSURE: 70 MMHG | SYSTOLIC BLOOD PRESSURE: 136 MMHG

## 2019-01-10 VITALS — DIASTOLIC BLOOD PRESSURE: 64 MMHG | SYSTOLIC BLOOD PRESSURE: 176 MMHG

## 2019-01-10 VITALS — SYSTOLIC BLOOD PRESSURE: 177 MMHG | DIASTOLIC BLOOD PRESSURE: 64 MMHG

## 2019-01-10 VITALS — SYSTOLIC BLOOD PRESSURE: 176 MMHG | DIASTOLIC BLOOD PRESSURE: 55 MMHG

## 2019-01-10 VITALS — DIASTOLIC BLOOD PRESSURE: 73 MMHG | SYSTOLIC BLOOD PRESSURE: 221 MMHG

## 2019-01-10 VITALS — SYSTOLIC BLOOD PRESSURE: 235 MMHG | DIASTOLIC BLOOD PRESSURE: 86 MMHG

## 2019-01-10 LAB
ALBUMIN SERPL-MCNC: 2.5 G/DL (ref 3.4–5)
ALP SERPL-CCNC: 44 U/L (ref 46–116)
ALT SERPL-CCNC: 20 U/L (ref 10–68)
ANION GAP SERPL CALC-SCNC: 14.5 MMOL/L (ref 8–16)
APPEARANCE UR: (no result)
BACTERIA #/AREA URNS HPF: (no result) /HPF
BILIRUB SERPL-MCNC: 0.26 MG/DL (ref 0.2–1.3)
BILIRUB SERPL-MCNC: NEGATIVE MG/DL
BUN SERPL-MCNC: 71 MG/DL (ref 7–18)
CALCIUM SERPL-MCNC: 9.1 MG/DL (ref 8.5–10.1)
CHLORIDE SERPL-SCNC: 104 MMOL/L (ref 98–107)
CO2 SERPL-SCNC: 24.9 MMOL/L (ref 21–32)
COLOR UR: YELLOW
CREAT SERPL-MCNC: 2.3 MG/DL (ref 0.6–1.3)
ERYTHROCYTE [DISTWIDTH] IN BLOOD BY AUTOMATED COUNT: 12.1 % (ref 11.5–14.5)
GLOBULIN SER-MCNC: 4.8 G/L
GLUCOSE SERPL-MCNC: 225 MG/DL (ref 74–106)
GLUCOSE SERPL-MCNC: NEGATIVE MG/DL
HCT VFR BLD CALC: 28.9 % (ref 36–48)
HGB BLD-MCNC: 9.9 G/DL (ref 12–16)
KETONES UR STRIP-MCNC: NEGATIVE MG/DL
LYMPHOCYTES NFR BLD AUTO: 39.2 % (ref 15–50)
MCH RBC QN AUTO: 29.7 PG (ref 26–34)
MCHC RBC AUTO-ENTMCNC: 34.3 G/DL (ref 31–37)
MCV RBC: 86.8 FL (ref 80–100)
NEUTROPHILS NFR BLD AUTO: 55.6 % (ref 40–80)
NITRITE UR-MCNC: NEGATIVE MG/ML
OSMOLALITY SERPL CALC.SUM OF ELEC: 305 MOSM/KG (ref 275–300)
PH UR STRIP: 6 [PH] (ref 5–6)
PLATELET # BLD: 258 10X3/UL (ref 130–400)
PMV BLD AUTO: 10.4 FL (ref 7.4–10.4)
POTASSIUM SERPL-SCNC: 4.4 MMOL/L (ref 3.5–5.1)
PROT SERPL-MCNC: 7.3 G/DL (ref 6.4–8.2)
PROT UR-MCNC: NEGATIVE MG/DL
RBC # BLD AUTO: 3.33 10X6/UL (ref 4–5.4)
RBC #/AREA URNS HPF: (no result) /HPF (ref 0–5)
SODIUM SERPL-SCNC: 139 MMOL/L (ref 136–145)
SP GR UR STRIP: 1.01 (ref 1–1.02)
SQUAMOUS #/AREA URNS HPF: (no result) /HPF (ref 0–5)
UROBILINOGEN UR-MCNC: NORMAL MG/DL
WBC # BLD AUTO: 7 10X3/UL (ref 4.8–10.8)
WBC #/AREA URNS HPF: (no result) /HPF (ref 0–5)

## 2019-01-10 NOTE — MORECARE
CASE MANAGEMENT DISCHARGE SUMMARY
 
 
PATIENT: JEISON VALENZUELA                      UNIT: [id number]
ACCOUNT#: [id number]                       ADM DATE: 19
AGE: 73     : [birth year]  SEX: F            ROOM/BED: D.2218    
AUTHOR: BONNY HARRIS                             PHYSICIAN:                               
 
REFERRING PHYSICIAN: MERRY PERES MD                
DATE OF SERVICE: 01/10/19
Discharge Plan
 
 
Patient Name: JEISON VALENZUELA
[facility]
Encounter #: [id number]
Medical Record #: [id number]
: 1945
Planned Disposition: Nursing Facility [facility]
Anticipated Discharge Date: 
 
Discharge Date: 
Expected LOS: 
Initial Reviewer: [email or web address]
Initial Review Date: 2019
Generated: 1/10/19   4:30 pm 
 DCPIA - Discharge Planning Initial Assessment
 
Updated by [email or web address]: Kaylin Escobedo on 1/10/19   3:29 pm
*  Is the patient Alert and Oriented?
Yes
*  How many steps to enter\exit or inside your home?
 
*  PCP
[facility]
*  Pharmacy
[facility]
*  Preadmission Environment
Long Term Nursing Home
*  Facility Name
[facility]
*  ADLs
Total Dependent
*  List name and contact numbers for known caregivers / representatives who 
currently or will assist patient after discharge:
TARAH CROWE (DAUGHTER)   [phone]
 
*  Verbal permission to speak to the caregivers and representatives has been 
obtained from the patient.
Yes
 
*  Additional services required to return to the preadmission environment?
No
*  Can the patient safely return to the preadmission environment?
Yes
*  Has this patient been hospitalized within the prior 30 days at any 
hospital?
No
 
 
 
 
 
 
Patient Name: JEISON VALENZUELA
Encounter #: [id number]
Page [id number]
 
 
 
 
 
Electronically Signed by BONNY HARRIS on 01/10/19 at 1530
 
 
 
 
 
 
**All edits/amendments must be made on the electronic document**
 
DICTATION DATE: 01/10/19 1530     : YAMILEX  01/10/19 1530     
RPT#: [id number]                                DC DATE:        
                                               STATUS: ADM IN  
[facility]
 [place] [address]
***END OF REPORT***

## 2019-01-10 NOTE — MORECARE
CASE MANAGEMENT DISCHARGE SUMMARY
 
 
PATIENT: JEISON VALENZUELA                      UNIT: [id number]
ACCOUNT#: [id number]                       ADM DATE: 19
AGE: 73     : 43  SEX: F            ROOM/BED: D.2218    
AUTHOR: KIMBERLYDOC                             PHYSICIAN:                               
 
REFERRING PHYSICIAN: MERRY PERES MD                
DATE OF SERVICE: 01/10/19
Discharge Plan
 
 
Patient Name: JEISON VALENZUELA
Facility: [facility]:[place]
Encounter #: [id number]
Medical Record #: [id number]
: 1945
Planned Disposition: Nursing Facility [facility] Cert
Anticipated Discharge Date: 
 
Discharge Date: 
Expected LOS: 
Initial Reviewer: [email or web address]
Initial Review Date: 2019
Generated: 1/10/19   4:43 pm 
Comments
 
DCP- Discharge Planning
 
Updated by [id number]: Kaylin Escobedo on 1/10/19   2:32 pm CT
Patient Name: JEISON VALENZUELA                                     
Admission Status: ER   
Accout number: [id number]                              
Admission Date: 2019   
: 1945                                                        
Admission Diagnosis:   
Attending: MERRY PERES                                                
Current LOS:  2   
  
Anticipated DC Date:    
Planned Disposition: Nursing Facility [facility] Cert   
Primary Insurance: MEDICARE A & B   
  
  
Discharge Planning Comments:   
CM met with patient to assess discharge planning needs. Patient is a long 
term resident at [facility] in [place].  She plans to return there at 
discharge. She is complete care. Her daughter is at her bedside and answered 
my questions. CM will continue to follow and assist with discharge planning 
 
needs.   
  
  
  
  
  
: Kaylin Escobedo
 DCPIA - Discharge Planning Initial Assessment
 
Updated by [email or web address]: Kaylin Escobedo on 1/10/19   3:29 pm
*  Is the patient Alert and Oriented?
Yes
*  How many steps to enter\exit or inside your home?
 
*  PCP
[facility]
*  Pharmacy
[facility]
*  Preadmission Environment
Long Term Nursing Home
*  Facility Name
[facility]
*  ADLs
Total Dependent
*  List name and contact numbers for known caregivers / representatives who 
currently or will assist patient after discharge:
TARAH CROWE (DAUGHTER)   [phone]
 
*  Verbal permission to speak to the caregivers and representatives has been 
obtained from the patient.
Yes
*  Additional services required to return to the preadmission environment?
No
*  Can the patient safely return to the preadmission environment?
Yes
*  Has this patient been hospitalized within the prior 30 days at any 
hospital?
No
 
 
 
 
 
 
 
Last DP export: 1/10/19   2:30 p
Patient Name: JEISON VALENZUELA
 
Encounter #: [id number]
Page [id number]
 
 
 
 
 
Electronically Signed by BONNY HARRIS on 01/10/19 at 1543
 
 
 
 
 
 
**All edits/amendments must be made on the electronic document**
 
DICTATION DATE: 01/10/19 1542     : YAMILEX  01/10/19 1542     
RPT#: [id number]                                DC DATE:        
                                               STATUS: ADM IN  
[facility]
 [place] [address]
***END OF REPORT***

## 2019-01-11 VITALS — SYSTOLIC BLOOD PRESSURE: 111 MMHG | DIASTOLIC BLOOD PRESSURE: 50 MMHG

## 2019-01-11 VITALS — DIASTOLIC BLOOD PRESSURE: 32 MMHG | SYSTOLIC BLOOD PRESSURE: 147 MMHG

## 2019-01-11 VITALS — SYSTOLIC BLOOD PRESSURE: 140 MMHG | DIASTOLIC BLOOD PRESSURE: 51 MMHG

## 2019-01-11 VITALS — SYSTOLIC BLOOD PRESSURE: 116 MMHG | DIASTOLIC BLOOD PRESSURE: 53 MMHG

## 2019-01-11 VITALS — DIASTOLIC BLOOD PRESSURE: 54 MMHG | SYSTOLIC BLOOD PRESSURE: 176 MMHG

## 2019-01-11 LAB
ALBUMIN SERPL-MCNC: 2.6 G/DL (ref 3.4–5)
ALP SERPL-CCNC: 40 U/L (ref 46–116)
ALT SERPL-CCNC: 18 U/L (ref 10–68)
ANION GAP SERPL CALC-SCNC: 13.3 MMOL/L (ref 8–16)
BASOPHILS NFR BLD AUTO: 0.3 % (ref 0–2)
BILIRUB SERPL-MCNC: 0.2 MG/DL (ref 0.2–1.3)
BUN SERPL-MCNC: 57 MG/DL (ref 7–18)
CALCIUM SERPL-MCNC: 8.8 MG/DL (ref 8.5–10.1)
CHLORIDE SERPL-SCNC: 106 MMOL/L (ref 98–107)
CO2 SERPL-SCNC: 26.8 MMOL/L (ref 21–32)
CREAT SERPL-MCNC: 2.3 MG/DL (ref 0.6–1.3)
EOSINOPHIL NFR BLD: 3.1 % (ref 0–7)
ERYTHROCYTE [DISTWIDTH] IN BLOOD BY AUTOMATED COUNT: 13 % (ref 11.5–14.5)
GLOBULIN SER-MCNC: 4.6 G/L
GLUCOSE SERPL-MCNC: 188 MG/DL (ref 74–106)
HCT VFR BLD CALC: 30 % (ref 36–48)
HGB BLD-MCNC: 9.8 G/DL (ref 12–16)
IMM GRANULOCYTES NFR BLD: 0.2 % (ref 0–5)
LYMPHOCYTES NFR BLD AUTO: 39.1 % (ref 15–50)
MCH RBC QN AUTO: 28.4 PG (ref 26–34)
MCHC RBC AUTO-ENTMCNC: 32.7 G/DL (ref 31–37)
MCV RBC: 87 FL (ref 80–100)
MONOCYTES NFR BLD: 5.2 % (ref 2–11)
NEUTROPHILS NFR BLD AUTO: 52.1 % (ref 40–80)
OSMOLALITY SERPL CALC.SUM OF ELEC: 303 MOSM/KG (ref 275–300)
PLATELET # BLD: 281 10X3/UL (ref 130–400)
PMV BLD AUTO: 10.5 FL (ref 7.4–10.4)
POTASSIUM SERPL-SCNC: 4.1 MMOL/L (ref 3.5–5.1)
PROT SERPL-MCNC: 7.2 G/DL (ref 6.4–8.2)
RBC # BLD AUTO: 3.45 10X6/UL (ref 4–5.4)
SODIUM SERPL-SCNC: 142 MMOL/L (ref 136–145)
WBC # BLD AUTO: 8.7 10X3/UL (ref 4.8–10.8)

## 2019-01-11 NOTE — MORECARE
CASE MANAGEMENT DISCHARGE SUMMARY
 
 
PATIENT: JEISON VALENZUELA                      UNIT: [id number]
ACCOUNT#: [id number]                       ADM DATE: 19
AGE: 73     : 43  SEX: F            ROOM/BED: D.2218    
AUTHOR: KIMBERLY,DOC                             PHYSICIAN:                               
 
REFERRING PHYSICIAN: MERRY PERES MD                
DATE OF SERVICE: 19
Discharge Plan
 
 
Patient Name: JEISON VALENZUELA
Facility: [facility]:[place]
Encounter #: [id number]
Medical Record #: [id number]
: 1945
Planned Disposition: Nursing Facility [facility] Cert
Anticipated Discharge Date: 
 
Discharge Date: 
Expected LOS: 
Initial Reviewer: [id number]
Initial Review Date: 2019
Generated: 19   4:30 pm 
Comments
 
DCP- Discharge Planning
 
Updated by [id number]: Kaylin Escobedo on 19   2:19 pm CT
PATIENT DISCHARGING TO [facility] TO A LONGTERM BED   
IMM SERVED AND EXPLAINED AND TO DAUGHTER AND PATIENT. PATIENT WILL GO VIA EMS
DCP- Discharge Planning
 
Updated by [email or web address]: Kaylin Escobedo on 1/10/19   2:32 pm CT
Patient Name: JEISON VALENZUELA                                     
Admission Status: ER   
Accout number: [id number]                              
Admission Date: 2019   
: 1945                                                        
Admission Diagnosis:   
Attending: MERRY PERES                                                
Current LOS:  2   
  
Anticipated DC Date:    
Planned Disposition: Nursing Facility [facility]   
Primary Insurance: MEDICARE A & B   
  
  
 
Discharge Planning Comments:   
CM met with patient to assess discharge planning needs. Patient is a long 
term resident at [facility] in [place].  She plans to return there at 
discharge. She is complete care. Her daughter is at her bedside and answered 
my questions. CM will continue to follow and assist with discharge planning 
needs.   
  
  
  
  
  
: Kaylin Escobedo
 DCPIA - Discharge Planning Initial Assessment
 
Updated by [id number]: Kaylin Escobedo on 1/10/19   3:29 pm
*  Is the patient Alert and Oriented?
Yes
*  How many steps to enter\exit or inside your home?
 
*  PCP
[facility]
*  Pharmacy
[facility]
*  Preadmission Environment
Long Term Nursing Home
*  Facility Name
[facility]
*  ADLs
Total Dependent
*  List name and contact numbers for known caregivers / representatives who 
currently or will assist patient after discharge:
TARAH CROWE (DAUGHTER)   [phone]
 
*  Verbal permission to speak to the caregivers and representatives has been 
obtained from the patient.
Yes
*  Additional services required to return to the preadmission environment?
No
*  Can the patient safely return to the preadmission environment?
Yes
*  Has this patient been hospitalized within the prior 30 days at any 
hospital?
No
 
 
 
 
 
Coverage Notice
 
Reviewer: [email or web address] Bailey Escobedo
 
 
Notice Issued Date-Time: 2019  15:00
Notice Type: IM Discharge Notice
 
Notice Delivered To: Family Member
Relationship to Patient: Daughter
Representative Name: TARAH CROWE
 
Delivery Method: HAND - Hand Delivered
Yaneth Days:
Prior Verbal Notification: 
 
Recipient Understood Notice: Yes
Recipient Signature: Yes
Med Rec Note Co-signed by Attending:
 
Coverage Notice Comment:  
 
Last DP export: 19   2:11 p
Patient Name: JEISON VALENZUELA
Encounter #: [id number]
Page [id number]
 
 
 
 
 
Electronically Signed by BONNY HARRIS on 19 at 1530
 
 
 
 
 
 
**All edits/amendments must be made on the electronic document**
 
DICTATION DATE: 19 153     : YAMILEX  19 1530     
RPT#: [id number]                                DC DATE:        
                                               STATUS: ADM IN  
[facility]
[address] [place] [address]
***END OF REPORT***

## 2019-01-11 NOTE — MORECARE
CASE MANAGEMENT DISCHARGE SUMMARY
 
 
PATIENT: JEISON VALENZUELA                      UNIT: [id number]
ACCOUNT#: [id number]                       ADM DATE: 19
AGE: 73     : 43  SEX: F            ROOM/BED: D.2218    
AUTHOR: KIMBERLY,DOC                             PHYSICIAN:                               
 
REFERRING PHYSICIAN: MERRY PERES MD                
DATE OF SERVICE: 19
Discharge Plan
 
 
Patient Name: JEISON VALENZUELA
Facility: [facility]:[place]
Encounter #: [id number]
Medical Record #: [id number]
: 1945
Planned Disposition: Nursing Facility [facility]
Anticipated Discharge Date: 
 
Discharge Date: 
Expected LOS: 
Initial Reviewer: [email or web address]
Initial Review Date: 2019
Generated: 19   4:11 pm 
Comments
 
DCP- Discharge Planning
 
Updated by [id number]: Kaylin Escobedo on 1/10/19   2:32 pm CT
Patient Name: JEISON VALENZUELA                                     
Admission Status: ER   
Accout number: [id number]                              
Admission Date: 2019   
: 1945                                                        
Admission Diagnosis:   
Attending: MERRY PERES                                                
Current LOS:  2   
  
Anticipated DC Date:    
Planned Disposition: Nursing Facility [facility]   
Primary Insurance: MEDICARE A & B   
  
  
Discharge Planning Comments:   
CM met with patient to assess discharge planning needs. Patient is a long 
term resident at [facility] in [place].  She plans to return there at 
discharge. She is complete care. Her daughter is at her bedside and answered 
my questions. CM will continue to follow and assist with discharge planning 
 
needs.   
  
  
  
  
  
: Kaylin Escobedo
 DCPIA - Discharge Planning Initial Assessment
 
Updated by [email or web address]: Kaylin Escobedo on 1/10/19   3:29 pm
*  Is the patient Alert and Oriented?
Yes
*  How many steps to enter\exit or inside your home?
 
*  PCP
[facility]
*  Pharmacy
[facility]
*  Preadmission Environment
Long Term Nursing Home
*  Facility Name
[facility]
*  ADLs
Total Dependent
*  List name and contact numbers for known caregivers / representatives who 
currently or will assist patient after discharge:
TARAH CROWE (DAUGHTER)   [phone]
 
*  Verbal permission to speak to the caregivers and representatives has been 
obtained from the patient.
Yes
*  Additional services required to return to the preadmission environment?
No
*  Can the patient safely return to the preadmission environment?
Yes
*  Has this patient been hospitalized within the prior 30 days at any 
hospital?
No
 
External Providers
External Provider: [facility]
 
Next Contact Date: 
Service Request Date: 
Service Type: 
Resolution: 
 
Reviewer: 
Comments: 
 
 
 
 
 
 
 
Last DP export: 1/10/19   2:43 p
Patient Name: JEISON VALENZUELA
Encounter #: [id number]
Page [id number]
 
 
 
 
 
Electronically Signed by BONNY HARRIS on 19 at 1511
 
 
 
 
 
 
**All edits/amendments must be made on the electronic document**
 
DICTATION DATE: 19     : YAMILEX  19     
RPT#: [id number]                                DC DATE:        
                                               STATUS: ADM IN  
[facility]
[address] [place] [address]
***END OF REPORT***

## 2019-01-22 ENCOUNTER — HOSPITAL ENCOUNTER (OUTPATIENT)
Dept: HOSPITAL 84 - D.SP | Age: 74
Discharge: HOME | End: 2019-01-22
Attending: INTERNAL MEDICINE
Payer: MEDICARE

## 2019-01-22 DIAGNOSIS — I69.321: Primary | ICD-10-CM

## 2019-03-28 ENCOUNTER — HOSPITAL ENCOUNTER (INPATIENT)
Dept: HOSPITAL 84 - D.ER | Age: 74
LOS: 3 days | Discharge: NURSING, REHAB OR LONG-TERM CARE | DRG: 872 | End: 2019-03-31
Attending: INTERNAL MEDICINE | Admitting: INTERNAL MEDICINE
Payer: MEDICARE

## 2019-03-28 VITALS — DIASTOLIC BLOOD PRESSURE: 84 MMHG | SYSTOLIC BLOOD PRESSURE: 183 MMHG

## 2019-03-28 VITALS — WEIGHT: 187 LBS | HEIGHT: 64 IN | BODY MASS INDEX: 31.92 KG/M2

## 2019-03-28 DIAGNOSIS — E11.65: ICD-10-CM

## 2019-03-28 DIAGNOSIS — N39.0: ICD-10-CM

## 2019-03-28 DIAGNOSIS — A41.9: Primary | ICD-10-CM

## 2019-03-28 DIAGNOSIS — T83.511A: ICD-10-CM

## 2019-03-28 DIAGNOSIS — I10: ICD-10-CM

## 2019-03-28 DIAGNOSIS — E11.9: ICD-10-CM

## 2019-03-28 DIAGNOSIS — I12.9: ICD-10-CM

## 2019-03-28 DIAGNOSIS — N18.9: ICD-10-CM

## 2019-03-28 DIAGNOSIS — I69.320: ICD-10-CM

## 2019-03-28 DIAGNOSIS — G81.91: ICD-10-CM

## 2019-03-28 DIAGNOSIS — E86.0: ICD-10-CM

## 2019-03-28 DIAGNOSIS — N17.9: ICD-10-CM

## 2019-03-28 LAB
ALBUMIN SERPL-MCNC: 2.8 G/DL (ref 3.4–5)
ALP SERPL-CCNC: 80 U/L (ref 46–116)
ALT SERPL-CCNC: 35 U/L (ref 10–68)
ANION GAP SERPL CALC-SCNC: 12.8 MMOL/L (ref 8–16)
APPEARANCE UR: (no result)
BACTERIA #/AREA URNS HPF: (no result) /HPF
BILIRUB SERPL-MCNC: 0.18 MG/DL (ref 0.2–1.3)
BILIRUB SERPL-MCNC: NEGATIVE MG/DL
BUN SERPL-MCNC: 62 MG/DL (ref 7–18)
CALCIUM SERPL-MCNC: 9.9 MG/DL (ref 8.5–10.1)
CHLORIDE SERPL-SCNC: 96 MMOL/L (ref 98–107)
CO2 SERPL-SCNC: 29.2 MMOL/L (ref 21–32)
COLOR UR: YELLOW
CREAT SERPL-MCNC: 2.3 MG/DL (ref 0.6–1.3)
EOSINOPHIL NFR BLD: 2 % (ref 0–7)
ERYTHROCYTE [DISTWIDTH] IN BLOOD BY AUTOMATED COUNT: 13.5 % (ref 11.5–14.5)
GLOBULIN SER-MCNC: 6.2 G/L
GLUCOSE SERPL-MCNC: 1000 MG/DL
GLUCOSE SERPL-MCNC: 350 MG/DL (ref 74–106)
HCT VFR BLD CALC: 30.9 % (ref 36–48)
HGB BLD-MCNC: 10.1 G/DL (ref 12–16)
KETONES UR STRIP-MCNC: NEGATIVE MG/DL
LYMPHOCYTES NFR BLD AUTO: 14 % (ref 15–50)
MAGNESIUM SERPL-MCNC: 2.2 MG/DL (ref 1.8–2.4)
MCH RBC QN AUTO: 26.9 PG (ref 26–34)
MCHC RBC AUTO-ENTMCNC: 32.7 G/DL (ref 31–37)
MCV RBC: 82.4 FL (ref 80–100)
MONOCYTES NFR BLD: 5 % (ref 2–11)
NEUTROPHILS NFR BLD AUTO: 77 % (ref 40–80)
NITRITE UR-MCNC: NEGATIVE MG/ML
OSMOLALITY SERPL CALC.SUM OF ELEC: 299 MOSM/KG (ref 275–300)
PH UR STRIP: 6 [PH] (ref 5–6)
PLATELET # BLD EST: (no result) 10*3/UL
PLATELET # BLD: 407 10X3/UL (ref 130–400)
PMV BLD AUTO: 10.2 FL (ref 7.4–10.4)
POTASSIUM SERPL-SCNC: 4 MMOL/L (ref 3.5–5.1)
PROT SERPL-MCNC: 9 G/DL (ref 6.4–8.2)
PROT UR-MCNC: (no result) MG/DL
RBC # BLD AUTO: 3.75 10X6/UL (ref 4–5.4)
RBC #/AREA URNS HPF: (no result) /HPF (ref 0–5)
SODIUM SERPL-SCNC: 134 MMOL/L (ref 136–145)
SP GR UR STRIP: 1.01 (ref 1–1.02)
SQUAMOUS #/AREA URNS HPF: (no result) /HPF (ref 0–5)
UROBILINOGEN UR-MCNC: NORMAL MG/DL
WBC # BLD AUTO: 21.4 10X3/UL (ref 4.8–10.8)

## 2019-03-28 NOTE — NUR
PT BROUGHT IN PER EMS FOR ABNORMAL LABS, ELEVATED [facility], [facility] GAVE PO ANTIBIOTIC.
PT HAS AN INDWELLING KELLEY. DAUGHTER TO BEDSIDE

## 2019-03-29 VITALS — SYSTOLIC BLOOD PRESSURE: 128 MMHG | DIASTOLIC BLOOD PRESSURE: 52 MMHG

## 2019-03-29 VITALS — DIASTOLIC BLOOD PRESSURE: 97 MMHG | SYSTOLIC BLOOD PRESSURE: 199 MMHG

## 2019-03-29 VITALS — DIASTOLIC BLOOD PRESSURE: 69 MMHG | SYSTOLIC BLOOD PRESSURE: 174 MMHG

## 2019-03-29 VITALS — SYSTOLIC BLOOD PRESSURE: 115 MMHG | DIASTOLIC BLOOD PRESSURE: 58 MMHG

## 2019-03-29 VITALS — DIASTOLIC BLOOD PRESSURE: 72 MMHG | SYSTOLIC BLOOD PRESSURE: 141 MMHG

## 2019-03-29 VITALS — DIASTOLIC BLOOD PRESSURE: 77 MMHG | SYSTOLIC BLOOD PRESSURE: 176 MMHG

## 2019-03-29 VITALS — DIASTOLIC BLOOD PRESSURE: 68 MMHG | SYSTOLIC BLOOD PRESSURE: 149 MMHG

## 2019-03-29 LAB
ANION GAP SERPL CALC-SCNC: 13.7 MMOL/L (ref 8–16)
BASOPHILS NFR BLD AUTO: 0.2 % (ref 0–2)
BUN SERPL-MCNC: 50 MG/DL (ref 7–18)
CALCIUM SERPL-MCNC: 8.7 MG/DL (ref 8.5–10.1)
CHLORIDE SERPL-SCNC: 100 MMOL/L (ref 98–107)
CO2 SERPL-SCNC: 27.4 MMOL/L (ref 21–32)
CREAT SERPL-MCNC: 2.2 MG/DL (ref 0.6–1.3)
EOSINOPHIL NFR BLD: 0.1 % (ref 0–7)
ERYTHROCYTE [DISTWIDTH] IN BLOOD BY AUTOMATED COUNT: 13.6 % (ref 11.5–14.5)
FERRITIN SERPL-MCNC: 514 NG/ML (ref 3–244)
GLUCOSE SERPL-MCNC: 318 MG/DL (ref 74–106)
HCT VFR BLD CALC: 29.8 % (ref 36–48)
HGB BLD-MCNC: 9.8 G/DL (ref 12–16)
IMM GRANULOCYTES NFR BLD: 0.4 % (ref 0–5)
IRON SERPL-MCNC: 24 UG/DL (ref 35–150)
LYMPHOCYTES NFR BLD AUTO: 15.8 % (ref 15–50)
MCH RBC QN AUTO: 27.2 PG (ref 26–34)
MCHC RBC AUTO-ENTMCNC: 32.9 G/DL (ref 31–37)
MCV RBC: 82.8 FL (ref 80–100)
MONOCYTES NFR BLD: 5 % (ref 2–11)
NEUTROPHILS NFR BLD AUTO: 78.5 % (ref 40–80)
OSMOLALITY SERPL CALC.SUM OF ELEC: 298 MOSM/KG (ref 275–300)
PLATELET # BLD: 390 10X3/UL (ref 130–400)
PMV BLD AUTO: 10 FL (ref 7.4–10.4)
POTASSIUM SERPL-SCNC: 4.1 MMOL/L (ref 3.5–5.1)
RBC # BLD AUTO: 3.6 10X6/UL (ref 4–5.4)
SAO2 % BLD FROM PO2: 12 % (ref 15–55)
SODIUM SERPL-SCNC: 137 MMOL/L (ref 136–145)
TIBC SERPL-MCNC: 187 UG/DL (ref 260–445)
UIBC SERPL-MCNC: 163 UG/DL (ref 150–375)
WBC # BLD AUTO: 19 10X3/UL (ref 4.8–10.8)

## 2019-03-29 NOTE — NUR
RECIEVED TO ROOM VIA STRECHER FROM ER. AWAKE,ALERT.KELLEY PATENT AND
DRAINING.COLOSTOMY BAG INTACT. PEG TUBE PATENT.PATIENT IS ASPHASGIC D/T OLD
CVA. DAUGHTER AT BEDSIDE.

## 2019-03-29 NOTE — NUR
RECEIVED REPORT, ASSUMED CARE, DAUGHTER AT BEDSIDE, CALL LIGHT IN REACH, BED
LOWEST POSITION, DENIES NEEDS, KELLEY TO GRAVITY, WILL CONTINUE POC

## 2019-03-30 VITALS — DIASTOLIC BLOOD PRESSURE: 59 MMHG | SYSTOLIC BLOOD PRESSURE: 132 MMHG

## 2019-03-30 VITALS — SYSTOLIC BLOOD PRESSURE: 121 MMHG | DIASTOLIC BLOOD PRESSURE: 52 MMHG

## 2019-03-30 VITALS — DIASTOLIC BLOOD PRESSURE: 89 MMHG | SYSTOLIC BLOOD PRESSURE: 210 MMHG

## 2019-03-30 VITALS — SYSTOLIC BLOOD PRESSURE: 135 MMHG | DIASTOLIC BLOOD PRESSURE: 70 MMHG

## 2019-03-30 VITALS — DIASTOLIC BLOOD PRESSURE: 63 MMHG | SYSTOLIC BLOOD PRESSURE: 145 MMHG

## 2019-03-30 VITALS — SYSTOLIC BLOOD PRESSURE: 152 MMHG | DIASTOLIC BLOOD PRESSURE: 68 MMHG

## 2019-03-30 LAB
ALBUMIN SERPL-MCNC: 2.3 G/DL (ref 3.4–5)
ALP SERPL-CCNC: 64 U/L (ref 46–116)
ALT SERPL-CCNC: 29 U/L (ref 10–68)
ANION GAP SERPL CALC-SCNC: 14.6 MMOL/L (ref 8–16)
BASOPHILS NFR BLD AUTO: 0.4 % (ref 0–2)
BILIRUB SERPL-MCNC: 0.16 MG/DL (ref 0.2–1.3)
BUN SERPL-MCNC: 46 MG/DL (ref 7–18)
CALCIUM SERPL-MCNC: 8.3 MG/DL (ref 8.5–10.1)
CHLORIDE SERPL-SCNC: 103 MMOL/L (ref 98–107)
CO2 SERPL-SCNC: 25.3 MMOL/L (ref 21–32)
CREAT SERPL-MCNC: 2.2 MG/DL (ref 0.6–1.3)
EOSINOPHIL NFR BLD: 1 % (ref 0–7)
ERYTHROCYTE [DISTWIDTH] IN BLOOD BY AUTOMATED COUNT: 13.9 % (ref 11.5–14.5)
FOLATE SERPL-MCNC: >20 NG/ML (ref 3–?)
GLOBULIN SER-MCNC: 4.6 G/L
GLUCOSE SERPL-MCNC: 182 MG/DL (ref 74–106)
HCT VFR BLD CALC: 28.5 % (ref 36–48)
HGB BLD-MCNC: 9 G/DL (ref 12–16)
IMM GRANULOCYTES NFR BLD: 0.2 % (ref 0–5)
LYMPHOCYTES NFR BLD AUTO: 28.8 % (ref 15–50)
MAGNESIUM SERPL-MCNC: 1.9 MG/DL (ref 1.8–2.4)
MCH RBC QN AUTO: 26.7 PG (ref 26–34)
MCHC RBC AUTO-ENTMCNC: 31.6 G/DL (ref 31–37)
MCV RBC: 84.6 FL (ref 80–100)
MONOCYTES NFR BLD: 5.7 % (ref 2–11)
NEUTROPHILS NFR BLD AUTO: 63.9 % (ref 40–80)
OSMOLALITY SERPL CALC.SUM OF ELEC: 294 MOSM/KG (ref 275–300)
PLATELET # BLD: 335 10X3/UL (ref 130–400)
PMV BLD AUTO: 10 FL (ref 7.4–10.4)
POTASSIUM SERPL-SCNC: 3.9 MMOL/L (ref 3.5–5.1)
PROT SERPL-MCNC: 6.9 G/DL (ref 6.4–8.2)
RBC # BLD AUTO: 3.37 10X6/UL (ref 4–5.4)
SODIUM SERPL-SCNC: 139 MMOL/L (ref 136–145)
VIT B12 SERPL-MCNC: 1183 PG/ML (ref 232–1245)
WBC # BLD AUTO: 12.7 10X3/UL (ref 4.8–10.8)

## 2019-03-30 NOTE — NUR
DR ZEPEDA CALLED PT BP AT 2000 /59 PULSE 80, PT DAUGHTER CAME TO DESK
AT 2145 PT FLUSHED AND ASKED FOR BP CHECK, AFTER CHECKING /89 PULSE 85,
DR ZEPEDA ORDERED NORVASC 10MG PO DAILY

## 2019-03-30 NOTE — NUR
PT SITTING UP IN BED ALERT. NO ACUTE DISTRESS NOTED.  FAMILY AT BEDSIDE.  IV
TO LEFT AC WITH NS @ 100 ML/HR INFUSING VIA PUMP.  SITE WITHOUT REDNESS OR
EDEMA.  COLOSTOMY INTACT, PEG INTACT BUT CLAMPED AT THIS TIME.  DENIES PAIN AT
THIS TIME.  PT PRESENTS HAPPY, LAUGHS AT APPROPRIATE TIMES, BUT ONLY REPLIES
"YEAH".  DENIES FURTHER NEEDS AT THIS TIME.  CL WITHIN REACH.  ENCOURAGED TO
CALL WITH NEEDS.  CONTINUE POC

## 2019-03-30 NOTE — NUR
I have reviewed this patient and I concur with the Shift Assessment completed
by the Licensed Practical Nurse today this shift. PT IS ASLEEP WITH FAMILY AT
THE BEDSIDE AT THIS TIME.

## 2019-03-31 VITALS — SYSTOLIC BLOOD PRESSURE: 134 MMHG | DIASTOLIC BLOOD PRESSURE: 71 MMHG

## 2019-03-31 VITALS — SYSTOLIC BLOOD PRESSURE: 174 MMHG | DIASTOLIC BLOOD PRESSURE: 53 MMHG

## 2019-03-31 VITALS — SYSTOLIC BLOOD PRESSURE: 124 MMHG | DIASTOLIC BLOOD PRESSURE: 47 MMHG

## 2019-03-31 LAB
ALBUMIN SERPL-MCNC: 2.3 G/DL (ref 3.4–5)
ALP SERPL-CCNC: 60 U/L (ref 46–116)
ALT SERPL-CCNC: 23 U/L (ref 10–68)
ANION GAP SERPL CALC-SCNC: 13.1 MMOL/L (ref 8–16)
BASOPHILS NFR BLD AUTO: 0.4 % (ref 0–2)
BILIRUB SERPL-MCNC: 0.16 MG/DL (ref 0.2–1.3)
BUN SERPL-MCNC: 43 MG/DL (ref 7–18)
CALCIUM SERPL-MCNC: 7.9 MG/DL (ref 8.5–10.1)
CHLORIDE SERPL-SCNC: 102 MMOL/L (ref 98–107)
CO2 SERPL-SCNC: 24.7 MMOL/L (ref 21–32)
CREAT SERPL-MCNC: 2.3 MG/DL (ref 0.6–1.3)
EOSINOPHIL NFR BLD: 2.2 % (ref 0–7)
ERYTHROCYTE [DISTWIDTH] IN BLOOD BY AUTOMATED COUNT: 13.9 % (ref 11.5–14.5)
GLOBULIN SER-MCNC: 5.3 G/L
GLUCOSE SERPL-MCNC: 244 MG/DL (ref 74–106)
HCT VFR BLD CALC: 25.6 % (ref 36–48)
HGB BLD-MCNC: 8.2 G/DL (ref 12–16)
IMM GRANULOCYTES NFR BLD: 0.2 % (ref 0–5)
LYMPHOCYTES NFR BLD AUTO: 28.4 % (ref 15–50)
MAGNESIUM SERPL-MCNC: 1.8 MG/DL (ref 1.8–2.4)
MCH RBC QN AUTO: 27 PG (ref 26–34)
MCHC RBC AUTO-ENTMCNC: 32 G/DL (ref 31–37)
MCV RBC: 84.2 FL (ref 80–100)
MONOCYTES NFR BLD: 6.7 % (ref 2–11)
NEUTROPHILS NFR BLD AUTO: 62.1 % (ref 40–80)
OSMOLALITY SERPL CALC.SUM OF ELEC: 290 MOSM/KG (ref 275–300)
PLATELET # BLD: 358 10X3/UL (ref 130–400)
PMV BLD AUTO: 10.2 FL (ref 7.4–10.4)
POTASSIUM SERPL-SCNC: 3.8 MMOL/L (ref 3.5–5.1)
PROT SERPL-MCNC: 7.6 G/DL (ref 6.4–8.2)
RBC # BLD AUTO: 3.04 10X6/UL (ref 4–5.4)
SODIUM SERPL-SCNC: 136 MMOL/L (ref 136–145)
WBC # BLD AUTO: 12.1 10X3/UL (ref 4.8–10.8)

## 2019-03-31 NOTE — NUR
PT RESTING IN BED WITH FAMILY AT BEDSIDE.  NO ACUTE DISTRESS NOTED.  DENIES
PAIN AT THIS TIME.  COLOSTOMY INTACT.  PEG INTACT AND CLAMPED.  DENIES NEEDS
AT THIS TIME.  CL WITHIN REACH.  ENCOURAGED TO CALL WITH NEEDS.  CONTINUE POC

## 2019-06-29 ENCOUNTER — HOSPITAL ENCOUNTER (INPATIENT)
Dept: HOSPITAL 84 - D.ER | Age: 74
LOS: 2 days | DRG: 871 | End: 2019-07-01
Attending: FAMILY MEDICINE | Admitting: FAMILY MEDICINE
Payer: MEDICARE

## 2019-06-29 VITALS
HEIGHT: 64 IN | WEIGHT: 224.17 LBS | BODY MASS INDEX: 38.27 KG/M2 | BODY MASS INDEX: 38.27 KG/M2 | WEIGHT: 224.17 LBS | HEIGHT: 64 IN | BODY MASS INDEX: 38.27 KG/M2

## 2019-06-29 VITALS — SYSTOLIC BLOOD PRESSURE: 146 MMHG | DIASTOLIC BLOOD PRESSURE: 104 MMHG

## 2019-06-29 VITALS — SYSTOLIC BLOOD PRESSURE: 117 MMHG | DIASTOLIC BLOOD PRESSURE: 73 MMHG

## 2019-06-29 VITALS — SYSTOLIC BLOOD PRESSURE: 136 MMHG | DIASTOLIC BLOOD PRESSURE: 76 MMHG

## 2019-06-29 VITALS — DIASTOLIC BLOOD PRESSURE: 101 MMHG | SYSTOLIC BLOOD PRESSURE: 150 MMHG

## 2019-06-29 VITALS — DIASTOLIC BLOOD PRESSURE: 58 MMHG | SYSTOLIC BLOOD PRESSURE: 117 MMHG

## 2019-06-29 VITALS — SYSTOLIC BLOOD PRESSURE: 144 MMHG | DIASTOLIC BLOOD PRESSURE: 87 MMHG

## 2019-06-29 DIAGNOSIS — K21.9: ICD-10-CM

## 2019-06-29 DIAGNOSIS — N17.9: ICD-10-CM

## 2019-06-29 DIAGNOSIS — Z66: ICD-10-CM

## 2019-06-29 DIAGNOSIS — Z79.4: ICD-10-CM

## 2019-06-29 DIAGNOSIS — E87.2: ICD-10-CM

## 2019-06-29 DIAGNOSIS — E11.65: ICD-10-CM

## 2019-06-29 DIAGNOSIS — I25.10: ICD-10-CM

## 2019-06-29 DIAGNOSIS — G93.41: ICD-10-CM

## 2019-06-29 DIAGNOSIS — E46: ICD-10-CM

## 2019-06-29 DIAGNOSIS — D64.9: ICD-10-CM

## 2019-06-29 DIAGNOSIS — R65.21: ICD-10-CM

## 2019-06-29 DIAGNOSIS — A41.9: Primary | ICD-10-CM

## 2019-06-29 DIAGNOSIS — N39.0: ICD-10-CM

## 2019-06-29 DIAGNOSIS — E83.52: ICD-10-CM

## 2019-06-29 DIAGNOSIS — I21.9: ICD-10-CM

## 2019-06-29 DIAGNOSIS — R53.2: ICD-10-CM

## 2019-06-29 DIAGNOSIS — D50.9: ICD-10-CM

## 2019-06-29 DIAGNOSIS — E87.1: ICD-10-CM

## 2019-06-29 DIAGNOSIS — J96.01: ICD-10-CM

## 2019-06-29 LAB
ALBUMIN SERPL-MCNC: 3.1 G/DL (ref 3.4–5)
ALP SERPL-CCNC: 92 U/L (ref 46–116)
ALT SERPL-CCNC: 20 U/L (ref 10–68)
AMORPHOUS SEDIMENT: (no result) /LPF
ANION GAP SERPL CALC-SCNC: 18.1 MMOL/L (ref 8–16)
APPEARANCE UR: (no result)
BACTERIA #/AREA URNS HPF: (no result) /HPF
BILIRUB SERPL-MCNC: 0.22 MG/DL (ref 0.2–1.3)
BILIRUB SERPL-MCNC: NEGATIVE MG/DL
BUN SERPL-MCNC: 65 MG/DL (ref 7–18)
CALCIUM SERPL-MCNC: 10.5 MG/DL (ref 8.5–10.1)
CHLORIDE SERPL-SCNC: 93 MMOL/L (ref 98–107)
CK MB SERPL-MCNC: 27.5 U/L (ref 0–3.6)
CK MB SERPL-MCNC: 9.8 U/L (ref 0–3.6)
CK SERPL-CCNC: 178 UL (ref 21–215)
CK SERPL-CCNC: 395 UL (ref 21–215)
CO2 SERPL-SCNC: 22.6 MMOL/L (ref 21–32)
COLOR UR: YELLOW
CREAT SERPL-MCNC: 2.6 MG/DL (ref 0.6–1.3)
D DIMER PPP FEU-MCNC: 1.48 UG/MLFEU (ref 0.2–0.54)
ERYTHROCYTE [DISTWIDTH] IN BLOOD BY AUTOMATED COUNT: 17 % (ref 11.5–14.5)
GLOBULIN SER-MCNC: 6.8 G/L
GLUCOSE SERPL-MCNC: 250 MG/DL
GLUCOSE SERPL-MCNC: 552 MG/DL (ref 74–106)
HCT VFR BLD CALC: 28.3 % (ref 36–48)
HGB BLD-MCNC: 9.1 G/DL (ref 12–16)
INR PPP: 1.34 (ref 0.85–1.17)
KETONES UR STRIP-MCNC: NEGATIVE MG/DL
LYMPHOCYTES NFR BLD AUTO: 10 % (ref 15–50)
MCH RBC QN AUTO: 23.2 PG (ref 26–34)
MCHC RBC AUTO-ENTMCNC: 32.2 G/DL (ref 31–37)
MCV RBC: 72 FL (ref 80–100)
MONOCYTES NFR BLD: 1 % (ref 2–11)
NEUTROPHILS NFR BLD AUTO: 82 % (ref 40–80)
NITRITE UR-MCNC: POSITIVE MG/ML
OSMOLALITY SERPL CALC.SUM OF ELEC: 302 MOSM/KG (ref 275–300)
PH UR STRIP: 9 [PH] (ref 5–6)
PLAT MORPH BLD: (no result)
PLATELET # BLD EST: (no result) 10*3/UL
PLATELET # BLD: 631 10X3/UL (ref 130–400)
PMV BLD AUTO: 9.8 FL (ref 7.4–10.4)
POTASSIUM SERPL-SCNC: 4.7 MMOL/L (ref 3.5–5.1)
PROT SERPL-MCNC: 9.9 G/DL (ref 6.4–8.2)
PROT UR-MCNC: (no result) MG/DL
PROTHROMBIN TIME: 16 SECONDS (ref 11.6–15)
RBC # BLD AUTO: 3.93 10X6/UL (ref 4–5.4)
RBC #/AREA URNS HPF: (no result) /HPF (ref 0–5)
SODIUM SERPL-SCNC: 129 MMOL/L (ref 136–145)
SP GR UR STRIP: 1 (ref 1–1.02)
SQUAMOUS #/AREA URNS HPF: (no result) /HPF (ref 0–5)
TROPONIN I SERPL-MCNC: 16.75 NG/ML (ref 0–0.06)
TROPONIN I SERPL-MCNC: 7.84 NG/ML (ref 0–0.06)
UROBILINOGEN UR-MCNC: NORMAL MG/DL
WBC # BLD AUTO: 25.4 10X3/UL (ref 4.8–10.8)
WBC #/AREA URNS HPF: (no result) /HPF (ref 0–5)

## 2019-06-29 NOTE — NUR
DR LONG NOTIFIED OF CARDIAC ENZYME TREND, NO ORDERS RECEIVED AT THIS TIME.
CONT POC, FAMILY NOTIFIED.

## 2019-06-29 NOTE — NUR
REC'D TO ICU. VSS  W/ BBB ON CM. B0 150/79. 96% SPO2 ON 3LNC. BATH
GIVEN. STAGE 2 TO COCCYX X 3. ONE IX SIZE OF QUARTER, ONE DIME SIZE AND ONE IS
2CM X 5CM. PT DENIES PAIN. RUE AND RLE WEAKNESS AND RLE FOOT DROP AND
DECREASED A ROM NOTED. COLOSTOMY W BROWN STOOL, BAG CDI.

## 2019-06-29 NOTE — EC
PATIENT:JEISON VALENZUELA                  DATE OF SERVICE: 06/29/19
SEX: F                                  MEDICAL RECORD: [id number]
DATE OF BIRTH: [birth year]                        LOCATION:[facility]     D230
AGE OF PATIENT: 74                             ADMISSION DATE: 06/29/19
 
REFERRING PHYSICIAN:                               
 
INTERPRETING PHYSICIAN: INDERJIT MEMBRENO MD             
 
 
 
                             ECHOCARDIOGRAM REPORT
  ECHO CHARGES 4               ECHO COMPLETE                 Date: 06/30/19
 
 
 
CLINICAL DIAGNOSIS: DYSPNEA/ELEVATED TROPONIN     
 
                         ECHOCARDIOGRAPHIC MEASUREMENTS
      (adult normal given)
   AC root (d.<3.7cm) 2.5  cm   LV Septum d (<1.2 cm> 1.3  cm
      Valve Excursion 1.4  cm     LV Septum (systole) 1.9  cm
Left Atria (s.<4.0cm> 4.1  cm          LVPW d(<1.2cm) 1.2  cm
        RV (d.<2.3cm) 2.2  cm           LVPW (sytole) 1.7  cm
  LV diastole(<5.6CM) 6.9  cm       MV E-F(>70mm/sec)      cm
           LV systole 5.4  cm           LVOT Diameter 1.5  cm
       MV exc.(>10mm)      cm
Est.ejection fraction (50-75%)     %
 
   DOPPLER:
     LVIT      cm/sec A      cm/sec E 166   cm/sec
       LA      cm/sec      RVSP 42.0 mmHg
     LVOT 74.0 cm/sec   AOP1/2T      m/s
  Asc. Ao 107  cm/sec
     RVOT 73.0 cm/sec
       RA      cm/sec
         cm/sec
 AV Gradient Peak 4.6  mmHg  AV Mean 2.6  mmHg  AV Area 0.9  cm
 MV Gradient Peak 13.0 mmHg  MV Mean 7.2  mmHg  MV Area      cm
   COMMENTS:                                              
 
 
 Cardiac Sonographer: Pierre IQBALOE            
      Cardiologist: 1          Dr. Membreno                
             TAPE# PACS           
                                       Pericardial Effusion Y                        
 
 
DATE OF SERVICE:  06/30/2019
 
PROCEDURE:  Echocardiogram.
 
FINDINGS:
1.  Left ventricular chamber size is dilated.  Left ventricular systolic
function is markedly reduced, overall ejection fraction 20%.
2.  Left atrium is enlarged at 4.1 cm.  Right atrium and right ventricular
chamber sizes are within normal limits.
3.  Valvular structures have normal structure and motion.
 
 
 
ECHOCARDIOGRAM REPORT                          [id number]    JEISON VALENZUELA          
 
 
4.  Doppler interrogation reveals moderate mitral regurgitation, moderate
tricuspid regurgitation, no other valvular insufficiency or stenosis.  Pulmonary
systolic pressure is estimated 42 mmHg.
5.  No evidence of pericardial effusion or left ventricular thrombus.
 
TRANSINT:[id number] Voice Confirmation ID: [id number] DOCUMENT ID: [id number]
                                           
                                           INDERJIT MEMBRENO MD             
 
 
 
 
 
 
 
 
 
 
 
 
 
 
 
 
 
 
 
 
 
 
 
 
 
 
 
 
 
 
 
 
 
 
 
 
 
 
CC:                                                             [id number]
DICTATION DATE: 07/01/19 1100     :     07/01/19 1126      DIS IN  
                                                                      07/01/19
[facility]                                          
[address] [id number]

## 2019-06-29 NOTE — NUR
PT PRESENTS WITH COLOSTOMY TO MIDDLE OF LOWER ABD AND INDWELLING KELLEY CATHETER
FROM NURSING HOME.  AS WELL AS DECUBITUS TO SACRAL REGION BETWEEN A STAGE 2-3
HEALING WELL PER DAUGHTER.

## 2019-06-30 VITALS — DIASTOLIC BLOOD PRESSURE: 80 MMHG | SYSTOLIC BLOOD PRESSURE: 143 MMHG

## 2019-06-30 VITALS — SYSTOLIC BLOOD PRESSURE: 152 MMHG | DIASTOLIC BLOOD PRESSURE: 85 MMHG

## 2019-06-30 VITALS — DIASTOLIC BLOOD PRESSURE: 60 MMHG | SYSTOLIC BLOOD PRESSURE: 97 MMHG

## 2019-06-30 VITALS — SYSTOLIC BLOOD PRESSURE: 73 MMHG | DIASTOLIC BLOOD PRESSURE: 43 MMHG

## 2019-06-30 VITALS — DIASTOLIC BLOOD PRESSURE: 82 MMHG | SYSTOLIC BLOOD PRESSURE: 138 MMHG

## 2019-06-30 VITALS — SYSTOLIC BLOOD PRESSURE: 167 MMHG | DIASTOLIC BLOOD PRESSURE: 86 MMHG

## 2019-06-30 VITALS — DIASTOLIC BLOOD PRESSURE: 81 MMHG | SYSTOLIC BLOOD PRESSURE: 143 MMHG

## 2019-06-30 VITALS — DIASTOLIC BLOOD PRESSURE: 66 MMHG | SYSTOLIC BLOOD PRESSURE: 107 MMHG

## 2019-06-30 VITALS — DIASTOLIC BLOOD PRESSURE: 67 MMHG | SYSTOLIC BLOOD PRESSURE: 107 MMHG

## 2019-06-30 VITALS — DIASTOLIC BLOOD PRESSURE: 84 MMHG | SYSTOLIC BLOOD PRESSURE: 137 MMHG

## 2019-06-30 VITALS — SYSTOLIC BLOOD PRESSURE: 123 MMHG | DIASTOLIC BLOOD PRESSURE: 65 MMHG

## 2019-06-30 VITALS — SYSTOLIC BLOOD PRESSURE: 125 MMHG | DIASTOLIC BLOOD PRESSURE: 86 MMHG

## 2019-06-30 VITALS — SYSTOLIC BLOOD PRESSURE: 107 MMHG | DIASTOLIC BLOOD PRESSURE: 60 MMHG

## 2019-06-30 VITALS — SYSTOLIC BLOOD PRESSURE: 168 MMHG | DIASTOLIC BLOOD PRESSURE: 983 MMHG

## 2019-06-30 VITALS — DIASTOLIC BLOOD PRESSURE: 66 MMHG | SYSTOLIC BLOOD PRESSURE: 103 MMHG

## 2019-06-30 VITALS — SYSTOLIC BLOOD PRESSURE: 142 MMHG | DIASTOLIC BLOOD PRESSURE: 77 MMHG

## 2019-06-30 VITALS — SYSTOLIC BLOOD PRESSURE: 122 MMHG | DIASTOLIC BLOOD PRESSURE: 83 MMHG

## 2019-06-30 VITALS — SYSTOLIC BLOOD PRESSURE: 134 MMHG | DIASTOLIC BLOOD PRESSURE: 71 MMHG

## 2019-06-30 VITALS — SYSTOLIC BLOOD PRESSURE: 98 MMHG | DIASTOLIC BLOOD PRESSURE: 62 MMHG

## 2019-06-30 VITALS — SYSTOLIC BLOOD PRESSURE: 120 MMHG | DIASTOLIC BLOOD PRESSURE: 71 MMHG

## 2019-06-30 VITALS — DIASTOLIC BLOOD PRESSURE: 87 MMHG | SYSTOLIC BLOOD PRESSURE: 130 MMHG

## 2019-06-30 VITALS — SYSTOLIC BLOOD PRESSURE: 106 MMHG | DIASTOLIC BLOOD PRESSURE: 53 MMHG

## 2019-06-30 VITALS — DIASTOLIC BLOOD PRESSURE: 71 MMHG | SYSTOLIC BLOOD PRESSURE: 124 MMHG

## 2019-06-30 LAB
ALBUMIN SERPL-MCNC: 2.7 G/DL (ref 3.4–5)
ALP SERPL-CCNC: 83 U/L (ref 46–116)
ALT SERPL-CCNC: 31 U/L (ref 10–68)
ANION GAP SERPL CALC-SCNC: 16.2 MMOL/L (ref 8–16)
BASOPHILS NFR BLD AUTO: 0 % (ref 0–2)
BILIRUB SERPL-MCNC: 0.25 MG/DL (ref 0.2–1.3)
BUN SERPL-MCNC: 61 MG/DL (ref 7–18)
CALCIUM SERPL-MCNC: 10 MG/DL (ref 8.5–10.1)
CHLORIDE SERPL-SCNC: 97 MMOL/L (ref 98–107)
CO2 SERPL-SCNC: 25.2 MMOL/L (ref 21–32)
CREAT SERPL-MCNC: 2.9 MG/DL (ref 0.6–1.3)
EOSINOPHIL NFR BLD: 0 % (ref 0–7)
ERYTHROCYTE [DISTWIDTH] IN BLOOD BY AUTOMATED COUNT: 17 % (ref 11.5–14.5)
GLOBULIN SER-MCNC: 6.6 G/L
GLUCOSE SERPL-MCNC: 425 MG/DL (ref 74–106)
HCT VFR BLD CALC: 27.2 % (ref 36–48)
HGB BLD-MCNC: 8.7 G/DL (ref 12–16)
IMM GRANULOCYTES NFR BLD: 0.4 % (ref 0–5)
LYMPHOCYTES NFR BLD AUTO: 5.6 % (ref 15–50)
MCH RBC QN AUTO: 22.8 PG (ref 26–34)
MCHC RBC AUTO-ENTMCNC: 32 G/DL (ref 31–37)
MCV RBC: 71.2 FL (ref 80–100)
MONOCYTES NFR BLD: 5.8 % (ref 2–11)
NEUTROPHILS NFR BLD AUTO: 88.2 % (ref 40–80)
OSMOLALITY SERPL CALC.SUM OF ELEC: 302 MOSM/KG (ref 275–300)
PLATELET # BLD: 606 10X3/UL (ref 130–400)
PMV BLD AUTO: 9.8 FL (ref 7.4–10.4)
POTASSIUM SERPL-SCNC: 4.4 MMOL/L (ref 3.5–5.1)
PROT SERPL-MCNC: 9.3 G/DL (ref 6.4–8.2)
RBC # BLD AUTO: 3.82 10X6/UL (ref 4–5.4)
SODIUM SERPL-SCNC: 134 MMOL/L (ref 136–145)
WBC # BLD AUTO: 27.6 10X3/UL (ref 4.8–10.8)

## 2019-06-30 NOTE — NUR
FAMILY STILL AT BEDSIDE AT THIS TIME. IT IS NOTED THAT PT'S SKIN COLOR
HAS DEVELOPED A GREY HUE. IT IS ALSO NOTED THAT PT'S HR HAS DROPPED TO THE
60'S WHEN IT HAS MAINTAINED AROUND 120 FOR THE ENTIRE DAY. WILL CONTINUE TO
MONITOR CLOSELY.

## 2019-06-30 NOTE — NUR
PT IS LAYING IN BED WITH EYES OPEN AT THIS TIME. NO NEEDS VOICED/NOTED. FAMILY
AT BEDSIDE. NO SIGNS OF ACUTE DISTRESS. WILL CONTINUE TO MONITOR.

## 2019-06-30 NOTE — NUR
FAMILY IN ROOM. REPOSITIONED FOR PATIENT COMFORT. ABD BREATHING, LUNGS MORE
CONGESTED. HANDS ARMS FEET LEGS COLD AND CLAMPY. HEAD OF BED ELEVATED
30DEGREES. TAKING SIPS OF WATER.

## 2019-06-30 NOTE — NUR
AWAKE SHAKES HEAD TO YES AND NO QUESTIONS. SOME ARE CORRECT PER DAUGHTER. NOT
ALL ARE CORRECT. SKIN HANDS FEET LOWER LEGS ARE COLD AND CLAMPY. BODY IS WARM
DOES COMPLAINT OF BEING COLD. DENIES PAIN. MONITOR ST WITH WIDE QRS. THREADY
PULSES. PALE COLOR. IV RIGHT WRIST INFUSING WITH LR AT 50 ML HOUR. KELLEY CATH
PATENT CLEAR YELLOW URINE. COLOSTOMY WITH SOFT FORMED STOOL. PEG CLAMPED.
FAMILY STATES THAT PATIENT DOES EAT AND CAN FEED HERSELF. PATIENT IS APHASIC,
BUT DOES RESPONSE WITH ALOT OF YES AND SOME SHAKING HER HEAD NO TO QUESTIONS.
HEELS ELEVATED WITH PILLOWS. WEDGES USED TO REPOSITION. HEAD OF BED ELEVATED
30 DEGREES. ABD BREATHING NOTED.

## 2019-06-30 NOTE — NUR
DR. LONG HERE STATES TO GIVEN HER MORPHINE FOR CHEST PAIN AND KEEP HER
COMFORTABLE. NO FURTHER ORDERS RECEIVED. HEART RATE NOTED. PATIENT DENIES PAIN
RIGHT NOW.

## 2019-06-30 NOTE — NUR
COMPLETE HIBCLENS BATH GIVEN WITH LINEN CHANGE HAIR WASHED. PATIENT TOLERATED
WELL. FAMILY HERE UPDATE GIVEN. QUESTIONS ANSWERED. MEDIPLEX DRESSING REPLACED
ON COCCYX. MINIMAL TAN DRAINAGE NOTED. HEALING STAGE 2 DECUB WITH 2 SMALL OPEN
AREAS LESS THAN 1 CM . NO OTHER SKIN BREAKDOWN NOTED

## 2019-06-30 NOTE — NUR
FAMILY HERE UPDATE GIVEN. NO CHANGES IN PATIENT DENIES PAIN. HANDS ARMS AND
LEGS STILL COLD AND CLAMPY.

## 2019-06-30 NOTE — NUR
REPORT RECEIVED, CARE ASSUMED. PT IS LAYING IN BED AT THIS TIME WITH EYES
OPEN. PT REPOSITIONED FOR COMFORT. NO FURTHER NEEDS NOTED AT THIS TIME. NO
SIGNS OF ACUTE DISTRESS. WILL CONTINUE TO MONITOR.

## 2019-06-30 NOTE — NUR
REASSESSMENT COMPLETED, SEE FLOWSHEET FOR DETAILS. PT IS LAYING IN BED WITH
FAMILY AT BEDSIDE. NO ACUTE CHANGES NOTED AT THIS TIME. NO SIGNS OF ACUTE
DISTRESS. WILL CONTINUE TO MONITOR.

## 2019-06-30 NOTE — NUR
LUNCH TRAY SERVED ATE FEW BITES, NOT VERY HUNGRY.NO CHANGES. IV PATENT RIGHT
WRIST. DR. DAILEY HERE TALKED WITH FAMILY.

## 2019-07-01 VITALS — DIASTOLIC BLOOD PRESSURE: 43 MMHG | SYSTOLIC BLOOD PRESSURE: 107 MMHG

## 2019-07-01 NOTE — CN
PATIENT NAME:JEISON VALENZUELA                                MEDICAL RECORD: [id number]
: 43                                              LOCATION:[id number]
ADMIT DATE: 19                                       ACCOUNT: [id number]
CONSULTING PHYSICIAN:    INDERJIT LONG MD             
                                               
REFERRING PHYSICIAN:     CRYS CHANEY MD          
 
 
DATE OF CONSULTATION:  2019
 
DIAGNOSES:
1.  Acute myocardial infarction.
2.  Coronary artery disease.
3.  Hypotension.
4.  Sepsis.
5.  Septic shock.
6.  History of hypertension.
7.  Diabetes.
 
HISTORY:  Mrs. Valenzuela is a nursing home patient who was sent here due to
decreasing mental status, found to have urosepsis and septic shock syndrome with
heart rates in the 120s and systolic blood pressures in the 70s.  Her troponin
is also significantly abnormal for an acute myocardial infarction.  She is
having chest pain.  She is also significantly anemic at 8.7, acute renal failure
with BUN of 61 and creatinine of 2.9.  Troponin is elevated up to 17.  We do not
know her cardiac history.
 
PHYSICAL EXAMINATION:
GENERAL APPEARANCE:  Well-nourished, well-developed, appears stated age.  Level
of distress, comfortable.
PSYCHIATRIC:  Mental status, alert, normal affect.  Orientation, oriented to
time, place and person.
EYES:  Lids and conjunctiva, noninjected.  No discharge, no pallor.
ENT:  Lips, teeth, gums, normal dentition.  Oropharynx, no cyanosis, no pallor.
NECK:  Carotid arteries, bilateral normal upstroke, no bruits, no thrills.
JUGULAR VEINS:  No jugular venous pressure or distention.
CERVICAL LYMPH NODES:  Nontender, nonenlarged.
THYROID:  Not enlarged.  Nontender.  No nodules.
LUNGS:  Respiratory effort, unlabored.
CHEST:  Normal curvature.  No thoracic deformity.  No chest wall tenderness. 
Percussion, resonant.  Auscultation, clear.  No wheezes, no rales, no rhonchi.
CARDIOVASCULAR:  Precordial exam, nondisplaced.  No heaves or pericardial
thrills.  Rate and rhythm, regular.  Heart sounds, normal S1, normal S2.  No S3,
no gallop, no rub.  Systolic murmur, not heard.  Diastolic murmur, not heard.
EXTREMITIES:  No cyanosis, no edema.  Peripheral pulses, full and equal in all
extremities, except as noted.  No bruits appreciated.
ABDOMEN:  Soft, nondistended.  Normal aorta.  No bruit.  Nontender.  No masses. 
Liver, nontender, no hepatomegaly.  Spleen, nontender, no splenomegaly.
MUSCULOSKELETAL:  No joint tenderness.  No joint swelling.  No erythema.
NEUROLOGICAL:  Normal gait, normal strength, normal tone.
SKIN:  Warm and dry.
 
OVERALL IMPRESSION:  Myocardial infarction in conjunction with septic shock,
anemia, and renal failure.  The patient is a DNR.  At this time, only comfort
care measures should be undertaken.  No other cardiac workup or treatment is
necessary.  We will get an echo to document her ejection fraction at this time.
 
TRANSINT:[id number] Voice Confirmation ID: [id number] DOCUMENT ID: [id number]
 
 
 
CONSULT REPORT                                 [id number]    JEISON VALENZUELA JEFFREY MD             
 
 
 
Electronically Signed by INDERJIT LONG on 19 at 0950
 
 
 
 
 
 
 
 
 
 
 
 
 
 
 
 
 
 
 
 
 
 
 
 
 
 
 
 
 
 
 
 
 
 
 
 
 
 
 
 
 
CC:                                                             [id number]
DICTATION DATE: 19 1113     :     19 1736      [facility] IN  
                                                                      19
[id number] [place] [address]

## 2019-07-01 NOTE — NUR
ENTERED PT'S ROOM DUE TO IV BEEPING. PT'S HR DROPPED FROM 50 TO 20 UPON
ENTERING ROOM, THEN PT WENT ASYSTOLE. PT WAS NOT BREATHING. BROUGHT FAMILY IN
TO SAY THEIR GOOD BYES. PHYSICIANS NOTIFIED.

## 2022-05-11 NOTE — NUR
[facility] CALLED AND GIVEN REPORT. STATED IT WILL BE ABOUT ONE HOUR BEFORE THEY
CAN GET HERE TO PICK PATIENT UP. Rhofade Pregnancy And Lactation Text: This medication has not been assigned a Pregnancy Risk Category. It is unknown if the medication is excreted in breast milk.

## 2024-10-24 NOTE — NUR
PATIENT ON RIGHT SIDE. HOB 30 DEGREES. AAOX4. RR EVEN AND UNLABORED. 0 S/S OF
DISTRESS. IV TO RIGHT WRIST PATENT WITH NO REDNESS OR SWELLING. DRESSING TO PE
TUBE CDI. COLOSTOMY TO ABD WITH SMALL AMOUNT OF FORMED STOOL. CHRONIC KELLEY
DRAINING TO GRAVITY. DRESSING TO COCCYX CDI. DAUGHTER AT BEDSIDE. SRX2. BED
LOW. CALL LIGHT WITHIN REACH. Protocol For Nbuvb: Hands/Feet: The patient received NBUVB. Protocol For Uva: The patient received UVA. Total Body Energy: 4574 Protocol For Bath Puva: The patient received Bath PUVA. Protocol For Photochemotherapy: Triamcinolone Ointment And Nbuvb: The patient received Photochemotherapy: Triamcinolone and NBUVB (triamcinolone ointment applied to all lesions prior to phototherapy). Protocol For Photochemotherapy: Mineral Oil And Broad Band Uvb: The patient received Photochemotherapy: Mineral Oil and Broad Band UVB. Protocol For Photochemotherapy: Tar And Nbuvb (Goeckerman Treatment): The patient received Photochemotherapy: Tar and NBUVB (Goeckerman treatment). Protocol For Uva1: The patient received UVA1. Post-Care Instructions: I reviewed with the patient in detail post-care instructions. Patient is to wear sun protection. Patients may expect sunburn like redness, discomfort and scabbing. Total Body Time: 4:30 Protocol: NBUVB Protocol For Photochemotherapy For Severe Photoresponsive Dermatoses: Tar And Broad Band Uvb (Goeckerman Treatment): The patient received Photochemotherapy for severe photoresponsive dermatoses: Tar and Broad Band UVB (Goeckerman treatment) requiring at least 4 to 8 hours of care under direct physician supervision. Protocol For Broad Band Uvb: The patient received Broad Band UVB. Protocol For Photochemotherapy For Severe Photoresponsive Dermatoses: Tar And Nbuvb (Goeckerman Treatment): The patient received Photochemotherapy for severe photoresponsive dermatoses: Tar and NBUVB (Goeckerman treatment) requiring at least 4 to 8 hours of care under direct physician supervision. Protocol For Protocol For Photochemotherapy For Severe Photoresponsive Dermatoses: Bath Puva: The patient received Photochemotherapy for severe photoresponsive dermatoses: Bath PUVA requiring at least 4 to 8 hours of care under direct physician supervision. Protocol For Photochemotherapy: Petrolatum And Nbuvb: The patient received Photochemotherapy: Petrolatum and NBUVB (petrolatum applied to all lesions prior to phototherapy). Protocol For Photochemotherapy For Severe Photoresponsive Dermatoses: Petrolatum And Broad Band Uvb: The patient received Photochemotherapyfor severe photoresponsive dermatoses: Petrolatum and Broad Band UVB requiring at least 4 to 8 hours of care under direct physician supervision. Detail Level: Zone Protocol For Photochemotherapy: Tar And Broad Band Uvb (Goeckerman Treatment): The patient received Photochemotherapy: Tar and Broad Band UVB (Goeckerman treatment). Consent: Written consent obtained.  The risks were reviewed with the patient including but not limited to: burn, pigmentary changes, pain, blistering, scabbing, redness, increased risk of skin cancers, and the remote possibility of scarring. Protocol For Photochemotherapy For Severe Photoresponsive Dermatoses: Petrolatum And Nbuvb: The patient received Photochemotherapy for severe photoresponsive dermatoses: Petrolatum and NBUVB requiring at least 4 to 8 hours of care under direct physician supervision. Protocol For Photochemotherapy: Baby Oil And Nbuvb: The patient received Photochemotherapy: Baby Oil and NBUVB (baby oil applied to all lesions prior to phototherapy). Name Of Supervising Technician: ERICK Protocol For Nb Uva: The patient received NB UVA. Protocol For Puva: The patient received PUVA. Protocol For Photochemotherapy: Petrolatum And Broad Band Uvb: The patient received Photochemotherapy: Petrolatum and Broad Band UVB. Render Post-Care In The Note: no Protocol For Photochemotherapy: Mineral Oil And Nbuvb: The patient received Photochemotherapy: Mineral Oil and NBUVB (mineral oil applied to all lesions prior to phototherapy). Protocol For Photochemotherapy For Severe Photoresponsive Dermatoses: Puva: The patient received Photochemotherapy for severe photoresponsive dermatoses: PUVA requiring at least 4 to 8 hours of care under direct physician supervision.

## 2024-12-09 NOTE — NUR
AFTER VISIT SUMMARY (AVS):    At today's visit we thoroughly discussed current symptoms, diagnosis, available treatment options, and the plan.    We decided to pursue neurosurgery referral to see if there is any surgical treatment options for your lumbar spine issues.    I refilled your Cymbalta, gabapentin, and amitriptyline prescriptions.    Next follow-up appointment is in the next 6 months or earlier if needed.    Please do not hesitate to call me with any questions or concerns.    Thanks.    FSBS 286. 6 UNITS HUMULIN ADMINISTERED PER SLIDING SCALE.